# Patient Record
Sex: MALE | Race: WHITE | Employment: FULL TIME | URBAN - METROPOLITAN AREA
[De-identification: names, ages, dates, MRNs, and addresses within clinical notes are randomized per-mention and may not be internally consistent; named-entity substitution may affect disease eponyms.]

---

## 2022-08-15 ENCOUNTER — OFFICE VISIT (OUTPATIENT)
Dept: ORTHOPEDIC SURGERY | Age: 57
End: 2022-08-15
Payer: COMMERCIAL

## 2022-08-15 DIAGNOSIS — M25.572 CHRONIC PAIN OF BOTH ANKLES: Primary | ICD-10-CM

## 2022-08-15 DIAGNOSIS — M79.671 CHRONIC PAIN OF BOTH FEET: ICD-10-CM

## 2022-08-15 DIAGNOSIS — M79.672 CHRONIC PAIN OF BOTH FEET: ICD-10-CM

## 2022-08-15 DIAGNOSIS — G89.29 CHRONIC PAIN OF BOTH ANKLES: Primary | ICD-10-CM

## 2022-08-15 DIAGNOSIS — M25.571 CHRONIC PAIN OF BOTH ANKLES: Primary | ICD-10-CM

## 2022-08-15 DIAGNOSIS — G89.29 CHRONIC PAIN OF BOTH FEET: ICD-10-CM

## 2022-08-15 DIAGNOSIS — M76.61 ACHILLES TENDINITIS, RIGHT LEG: ICD-10-CM

## 2022-08-15 DIAGNOSIS — M76.62 ACHILLES TENDINITIS, LEFT LEG: ICD-10-CM

## 2022-08-15 PROCEDURE — 99214 OFFICE O/P EST MOD 30 MIN: CPT | Performed by: ORTHOPAEDIC SURGERY

## 2022-08-15 NOTE — PROGRESS NOTES
Name: Jordyn Silveira  YOB: 1965  Gender: male  MRN: [id number]    Summary:     Bilateral insertional Achilles tendinitis right greater than left     CC: New Patient (Bilateral ankle and foot pain. Xrays taken today in office)       HPI: Jordyn Silveira is a 61 y.o. male who presents with New Patient (Bilateral ankle and foot pain. Xrays taken today in office)  . This patient presents the office of longstanding history of 20+ years of increasing right posterior heel pain. I reviewed his outside medical records from [place]. He is tried physical therapy for this in the past.  Is also been told he had multiple extra bones in his foot and may require injections or surgery. His pain is getting worse and progressive tickly on the right. History was obtained by Patient     ROS/Meds/PSH/PMH/FH/SH: I personally reviewed the patients standard intake form. Below are the pertinents    Tobacco:  has no history on file for tobacco use. Diabetes: None      Physical Examination:    Exam of the bilateral feet shows swelling and posterior heel pain bilaterally with a large Anisha's deformity. He has palpable pulses and intact sensation. There is no plantar fascial insufficiency. There is no Achilles insufficiency. Imaging:   I independently interpreted XR taken today  Bilateral feet and ankles XR: AP, Lateral, Oblique views     ICD-10-CM    1. Chronic pain of both ankles  M25.571 XR ANKLE STANDARD BILATERAL    G89.29     M25.572       2. Chronic pain of both feet  M79.671 XR FOOT STANDARD BILATERAL    G89.29     M79.672       3. Achilles tendinitis, right leg  M76.61       4.  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

## 2022-08-17 DIAGNOSIS — M76.61 ACHILLES TENDINITIS, RIGHT LEG: Primary | ICD-10-CM

## 2022-08-24 ENCOUNTER — TELEPHONE (OUTPATIENT)
Dept: ORTHOPEDIC SURGERY | Age: 57
End: 2022-08-24

## 2022-08-24 NOTE — TELEPHONE ENCOUNTER
Pt called and wants to speak to MA about what the recovery process will be for his sx next month. Please call pt.

## 2022-08-25 ENCOUNTER — TELEPHONE (OUTPATIENT)
Dept: ORTHOPEDIC SURGERY | Age: 57
End: 2022-08-25

## 2022-08-25 NOTE — TELEPHONE ENCOUNTER
Spoke to patient and talked to him about being in a walker boot, using pain medication and no driving in the boot. Patient voiced understanding.

## 2022-09-28 RX ORDER — ASPIRIN 81 MG/1
81 TABLET ORAL DAILY
Status: ON HOLD | COMMUNITY
End: 2022-09-29 | Stop reason: SDUPTHER

## 2022-09-28 RX ORDER — MELOXICAM 15 MG/1
15 TABLET ORAL DAILY PRN
Status: ON HOLD | COMMUNITY
Start: 2021-09-21 | End: 2022-09-29 | Stop reason: HOSPADM

## 2022-09-28 NOTE — PERIOP NOTE
Patient verified name and . Order for consent  found in EHR and matches case posting; patient verifies procedure. Type 1B surgery,  assessment complete. Orders  received. Labs per surgeon: none ordered  Labs per anesthesia protocol: not indicated    Patient answered medical/surgical history questions at their best of ability. All prior to admission medications documented in [facility]. Patient instructed to take the following medications the day of surgery according to anesthesia guidelines with a small sip of water: none. On the day before surgery please take Acetaminophen 1000mg in the morning and then again before bed. You may substitute for Tylenol 650 mg. Hold all vitamins now for surgery and NSAIDS (aspirin 81 mg-preventative, ibuprofen) now for surgery. Prescription meds to hold: meloxicam now for surgery    Patient instructed on the following:    > Arrive at [address] [place] [address], time of arrival to be called the day before by 1700  > NPO after midnight, unless otherwise indicated, including gum, mints, and ice chips  > Responsible adult must drive patient to the hospital, stay during surgery, and patient will need supervision 24 hours after anesthesia  > Use antibacterial soap in shower the night before surgery and on the morning of surgery  > All piercings must be removed prior to arrival.    > Leave all valuables (money and jewelry) at home but bring insurance card and ID on DOS.   > You may be required to pay a deductible or co-pay on the day of your procedure. You can pre-pay by calling [phone] if your surgery is at the [facility] or [phone] if your surgery is at the [facility]. > Do not wear make-up, nail polish, lotions, cologne, perfumes, powders, or oil on skin.      Patient verbalized understanding and denied questions

## 2022-09-29 ENCOUNTER — ANESTHESIA EVENT (OUTPATIENT)
Dept: SURGERY | Age: 57
End: 2022-09-29
Payer: COMMERCIAL

## 2022-09-29 ENCOUNTER — TELEPHONE (OUTPATIENT)
Dept: ORTHOPEDIC SURGERY | Age: 57
End: 2022-09-29

## 2022-09-29 ENCOUNTER — ANESTHESIA (OUTPATIENT)
Dept: SURGERY | Age: 57
End: 2022-09-29
Payer: COMMERCIAL

## 2022-09-29 ENCOUNTER — HOSPITAL ENCOUNTER (OUTPATIENT)
Age: 57
Setting detail: OUTPATIENT SURGERY
Discharge: HOME | End: 2022-09-29
Attending: ORTHOPAEDIC SURGERY | Admitting: ORTHOPAEDIC SURGERY
Payer: COMMERCIAL

## 2022-09-29 VITALS
DIASTOLIC BLOOD PRESSURE: 80 MMHG | RESPIRATION RATE: 16 BRPM | OXYGEN SATURATION: 96 % | BODY MASS INDEX: 33.03 KG/M2 | HEART RATE: 82 BPM | HEIGHT: 69 IN | WEIGHT: 223 LBS | SYSTOLIC BLOOD PRESSURE: 120 MMHG | TEMPERATURE: 98 F

## 2022-09-29 DIAGNOSIS — G89.18 ACUTE POST-OPERATIVE PAIN: Primary | ICD-10-CM

## 2022-09-29 DIAGNOSIS — M76.61 TENDONITIS, ACHILLES, RIGHT: ICD-10-CM

## 2022-09-29 PROCEDURE — 6360000002 HC RX W HCPCS: Performed by: NURSE ANESTHETIST, CERTIFIED REGISTERED

## 2022-09-29 PROCEDURE — 28118 REMOVAL OF HEEL BONE: CPT | Performed by: ORTHOPAEDIC SURGERY

## 2022-09-29 PROCEDURE — 2580000003 HC RX 258: Performed by: NURSE PRACTITIONER

## 2022-09-29 PROCEDURE — 2709999900 HC NON-CHARGEABLE SUPPLY: Performed by: ORTHOPAEDIC SURGERY

## 2022-09-29 PROCEDURE — 7100000010 HC PHASE II RECOVERY - FIRST 15 MIN: Performed by: ORTHOPAEDIC SURGERY

## 2022-09-29 PROCEDURE — 64447 NJX AA&/STRD FEMORAL NRV IMG: CPT | Performed by: ANESTHESIOLOGY

## 2022-09-29 PROCEDURE — 64445 NJX AA&/STRD SCIATIC NRV IMG: CPT | Performed by: ANESTHESIOLOGY

## 2022-09-29 PROCEDURE — 2720000010 HC SURG SUPPLY STERILE: Performed by: ORTHOPAEDIC SURGERY

## 2022-09-29 PROCEDURE — 6360000002 HC RX W HCPCS: Performed by: NURSE PRACTITIONER

## 2022-09-29 PROCEDURE — 3600000014 HC SURGERY LEVEL 4 ADDTL 15MIN: Performed by: ORTHOPAEDIC SURGERY

## 2022-09-29 PROCEDURE — 2500000003 HC RX 250 WO HCPCS: Performed by: NURSE ANESTHETIST, CERTIFIED REGISTERED

## 2022-09-29 PROCEDURE — 27654 REPAIR OF ACHILLES TENDON: CPT | Performed by: ORTHOPAEDIC SURGERY

## 2022-09-29 PROCEDURE — 3600000004 HC SURGERY LEVEL 4 BASE: Performed by: ORTHOPAEDIC SURGERY

## 2022-09-29 PROCEDURE — 3700000001 HC ADD 15 MINUTES (ANESTHESIA): Performed by: ORTHOPAEDIC SURGERY

## 2022-09-29 PROCEDURE — 3700000000 HC ANESTHESIA ATTENDED CARE: Performed by: ORTHOPAEDIC SURGERY

## 2022-09-29 PROCEDURE — 27691 REVISE LOWER LEG TENDON: CPT | Performed by: ORTHOPAEDIC SURGERY

## 2022-09-29 PROCEDURE — 7100000000 HC PACU RECOVERY - FIRST 15 MIN: Performed by: ORTHOPAEDIC SURGERY

## 2022-09-29 PROCEDURE — 6360000002 HC RX W HCPCS: Performed by: ANESTHESIOLOGY

## 2022-09-29 PROCEDURE — C1713 ANCHOR/SCREW BN/BN,TIS/BN: HCPCS | Performed by: ORTHOPAEDIC SURGERY

## 2022-09-29 DEVICE — HEALICOIL KNOTLESS PK  NST
Type: IMPLANTABLE DEVICE | Site: ANKLE | Status: FUNCTIONAL
Brand: HEALICOIL

## 2022-09-29 DEVICE — SCREW INTFR L15MM DIA6.25MM BIOCOMPOSITE FACILITATE IORT: Type: IMPLANTABLE DEVICE | Site: ANKLE | Status: FUNCTIONAL

## 2022-09-29 DEVICE — TWINFIX ULTRA 4.5 MM POLY L LACTIC                                    ACID-HA SUTURE ANCHOR WITH TWO NO.2                                    ULTRABRAID SUTURES BLUE,                                    BLUE-COBRAID WITH NEEDLES
Type: IMPLANTABLE DEVICE | Site: ANKLE | Status: FUNCTIONAL
Brand: TWINFIX

## 2022-09-29 RX ORDER — FENTANYL CITRATE 50 UG/ML
100 INJECTION, SOLUTION INTRAMUSCULAR; INTRAVENOUS
Status: COMPLETED | OUTPATIENT
Start: 2022-09-29 | End: 2022-09-29

## 2022-09-29 RX ORDER — SODIUM CHLORIDE, SODIUM LACTATE, POTASSIUM CHLORIDE, CALCIUM CHLORIDE 600; 310; 30; 20 MG/100ML; MG/100ML; MG/100ML; MG/100ML
INJECTION, SOLUTION INTRAVENOUS CONTINUOUS
Status: DISCONTINUED | OUTPATIENT
Start: 2022-09-29 | End: 2022-09-29 | Stop reason: HOSPADM

## 2022-09-29 RX ORDER — PROCHLORPERAZINE EDISYLATE 5 MG/ML
5 INJECTION INTRAMUSCULAR; INTRAVENOUS
Status: DISCONTINUED | OUTPATIENT
Start: 2022-09-29 | End: 2022-09-29 | Stop reason: HOSPADM

## 2022-09-29 RX ORDER — ROCURONIUM BROMIDE 10 MG/ML
INJECTION, SOLUTION INTRAVENOUS PRN
Status: DISCONTINUED | OUTPATIENT
Start: 2022-09-29 | End: 2022-09-29 | Stop reason: SDUPTHER

## 2022-09-29 RX ORDER — HYDROMORPHONE HYDROCHLORIDE 2 MG/ML
0.5 INJECTION, SOLUTION INTRAMUSCULAR; INTRAVENOUS; SUBCUTANEOUS EVERY 5 MIN PRN
Status: DISCONTINUED | OUTPATIENT
Start: 2022-09-29 | End: 2022-09-29 | Stop reason: HOSPADM

## 2022-09-29 RX ORDER — SODIUM CHLORIDE 0.9 % (FLUSH) 0.9 %
5-40 SYRINGE (ML) INJECTION PRN
Status: DISCONTINUED | OUTPATIENT
Start: 2022-09-29 | End: 2022-09-29 | Stop reason: HOSPADM

## 2022-09-29 RX ORDER — SODIUM CHLORIDE 9 MG/ML
INJECTION, SOLUTION INTRAVENOUS PRN
Status: DISCONTINUED | OUTPATIENT
Start: 2022-09-29 | End: 2022-09-29 | Stop reason: HOSPADM

## 2022-09-29 RX ORDER — LIDOCAINE HYDROCHLORIDE 10 MG/ML
1 INJECTION, SOLUTION INFILTRATION; PERINEURAL
Status: DISCONTINUED | OUTPATIENT
Start: 2022-09-29 | End: 2022-09-29 | Stop reason: HOSPADM

## 2022-09-29 RX ORDER — DEXAMETHASONE SODIUM PHOSPHATE 4 MG/ML
INJECTION, SOLUTION INTRA-ARTICULAR; INTRALESIONAL; INTRAMUSCULAR; INTRAVENOUS; SOFT TISSUE PRN
Status: DISCONTINUED | OUTPATIENT
Start: 2022-09-29 | End: 2022-09-29 | Stop reason: SDUPTHER

## 2022-09-29 RX ORDER — ASPIRIN 81 MG/1
81 TABLET ORAL 2 TIMES DAILY
Qty: 42 TABLET | Refills: 0 | Status: SHIPPED | OUTPATIENT
Start: 2022-09-29

## 2022-09-29 RX ORDER — PROPOFOL 10 MG/ML
INJECTION, EMULSION INTRAVENOUS PRN
Status: DISCONTINUED | OUTPATIENT
Start: 2022-09-29 | End: 2022-09-29 | Stop reason: SDUPTHER

## 2022-09-29 RX ORDER — SODIUM CHLORIDE, SODIUM LACTATE, POTASSIUM CHLORIDE, CALCIUM CHLORIDE 600; 310; 30; 20 MG/100ML; MG/100ML; MG/100ML; MG/100ML
100 INJECTION, SOLUTION INTRAVENOUS CONTINUOUS
Status: DISCONTINUED | OUTPATIENT
Start: 2022-09-29 | End: 2022-09-29 | Stop reason: SDUPTHER

## 2022-09-29 RX ORDER — LIDOCAINE HYDROCHLORIDE 20 MG/ML
INJECTION, SOLUTION EPIDURAL; INFILTRATION; INTRACAUDAL; PERINEURAL PRN
Status: DISCONTINUED | OUTPATIENT
Start: 2022-09-29 | End: 2022-09-29 | Stop reason: SDUPTHER

## 2022-09-29 RX ORDER — CEPHALEXIN 500 MG/1
500 CAPSULE ORAL 4 TIMES DAILY
Qty: 12 CAPSULE | Refills: 0 | Status: SHIPPED | OUTPATIENT
Start: 2022-09-29

## 2022-09-29 RX ORDER — OXYCODONE HYDROCHLORIDE 5 MG/1
5 TABLET ORAL EVERY 6 HOURS PRN
Qty: 20 TABLET | Refills: 0 | Status: SHIPPED | OUTPATIENT
Start: 2022-09-29 | End: 2022-10-04

## 2022-09-29 RX ORDER — MIDAZOLAM HYDROCHLORIDE 2 MG/2ML
2 INJECTION, SOLUTION INTRAMUSCULAR; INTRAVENOUS
Status: COMPLETED | OUTPATIENT
Start: 2022-09-29 | End: 2022-09-29

## 2022-09-29 RX ORDER — OXYCODONE HYDROCHLORIDE 5 MG/1
5 TABLET ORAL
Status: DISCONTINUED | OUTPATIENT
Start: 2022-09-29 | End: 2022-09-29 | Stop reason: HOSPADM

## 2022-09-29 RX ORDER — ONDANSETRON 2 MG/ML
INJECTION INTRAMUSCULAR; INTRAVENOUS PRN
Status: DISCONTINUED | OUTPATIENT
Start: 2022-09-29 | End: 2022-09-29 | Stop reason: SDUPTHER

## 2022-09-29 RX ORDER — SODIUM CHLORIDE 0.9 % (FLUSH) 0.9 %
5-40 SYRINGE (ML) INJECTION EVERY 12 HOURS SCHEDULED
Status: DISCONTINUED | OUTPATIENT
Start: 2022-09-29 | End: 2022-09-29 | Stop reason: HOSPADM

## 2022-09-29 RX ADMIN — MIDAZOLAM HYDROCHLORIDE 2 MG: 1 INJECTION, SOLUTION INTRAMUSCULAR; INTRAVENOUS at 07:59

## 2022-09-29 RX ADMIN — ROCURONIUM BROMIDE 50 MG: 50 INJECTION, SOLUTION INTRAVENOUS at 08:53

## 2022-09-29 RX ADMIN — SUGAMMADEX 200 MG: 100 INJECTION, SOLUTION INTRAVENOUS at 09:55

## 2022-09-29 RX ADMIN — FENTANYL CITRATE 100 MCG: 50 INJECTION, SOLUTION INTRAMUSCULAR; INTRAVENOUS at 07:59

## 2022-09-29 RX ADMIN — PROPOFOL 200 MG: 10 INJECTION, EMULSION INTRAVENOUS at 08:53

## 2022-09-29 RX ADMIN — ONDANSETRON 4 MG: 2 INJECTION INTRAMUSCULAR; INTRAVENOUS at 09:09

## 2022-09-29 RX ADMIN — SODIUM CHLORIDE, POTASSIUM CHLORIDE, SODIUM LACTATE AND CALCIUM CHLORIDE: 600; 310; 30; 20 INJECTION, SOLUTION INTRAVENOUS at 07:14

## 2022-09-29 RX ADMIN — DEXAMETHASONE SODIUM PHOSPHATE 4 MG: 4 INJECTION, SOLUTION INTRAMUSCULAR; INTRAVENOUS at 09:09

## 2022-09-29 RX ADMIN — Medication 2000 MG: at 09:06

## 2022-09-29 RX ADMIN — PROPOFOL 50 MG: 10 INJECTION, EMULSION INTRAVENOUS at 09:52

## 2022-09-29 RX ADMIN — LIDOCAINE HYDROCHLORIDE 60 MG: 20 INJECTION, SOLUTION EPIDURAL; INFILTRATION; INTRACAUDAL; PERINEURAL at 08:53

## 2022-09-29 ASSESSMENT — PAIN - FUNCTIONAL ASSESSMENT: PAIN_FUNCTIONAL_ASSESSMENT: 0-10

## 2022-09-29 NOTE — DISCHARGE INSTRUCTIONS
INSTRUCTIONS FOLLOWING FOOT SURGERY    ACTIVITY  Elevate foot. No Ice    1.)No weight bearing. Use crutches or knee walker until seen in follow up appointment    Blood clot prevention:  As instructed by Dr Westley Lugo: Take 81 mg twice daily if okay with your medical doctor and you have no GI ulcer. Get up and out of bed frequently. While in bed move the legs as much as possible. DRESSING CARE Keep dry and in place until follow up appointment. Cover with cast bag or plastic bag when showering. Let the office know if dressing gets saturated with water. Don't put anything into the splint to relieve itching etc.     CALL YOUR DOCTOR IF YOU HAVE  Excessive bleeding that does not stop after holding mild pressure over the area. Temperature of 101 degrees or above. Redness, excessive swelling or bruising, and/or green or yellow, smelly discharge from incision. Loss of sensation - cold, white or blue toes. DIET  Day of Surgery: Clear liquids until no nausea or vomiting; then light, bland diet (Baked chicken, plain rice, grits, scrambled egg, toast). Nothing Greasy, fried or spicy today  Advance to regular diet on second day, unless your doctor orders otherwise. PAIN  Take pain medications as directed by your doctor. Call your doctor if pain is NOT relieved by medication. MEDICATION INTERACTION:  During your procedure you potentially received a medication or medications which may reduce the effectiveness of oral contraceptives. Please consider other forms of contraception for 1 month following your procedure if you are currently using oral contraceptives as your primary form of birth control.  In addition to this, we recommend continuing your oral contraceptive as prescribed, unless otherwise instructed by your physician, during this time    After general anesthesia or intravenous sedation, for 24 hours or while taking prescription Narcotics:  Limit your activities  A responsible adult needs to be with you for the next 24 hours  Do not drive and operate hazardous machinery  Do not make important personal or business decisions  Do not drink alcoholic beverages  If you have not urinated within 8 hours after discharge, and you are experiencing discomfort from urinary retention, please go to the nearest ED. If you have sleep apnea and have a CPAP machine, please use it for all naps and sleeping. Please use caution when taking narcotics and any of your home medications that may cause drowsiness. *  Please give a list of your current medications to your Primary Care Provider. *  Please update this list whenever your medications are discontinued, doses are      changed, or new medications (including over-the-counter products) are added. *  Please carry medication information at all times in case of emergency situations. These are general instructions for a healthy lifestyle:  No smoking/ No tobacco products/ Avoid exposure to second hand smoke  Surgeon General's Warning:  Quitting smoking now greatly reduces serious risk to your health. Obesity, smoking, and sedentary lifestyle greatly increases your risk for illness  A healthy diet, regular physical exercise & weight monitoring are important for maintaining a healthy lifestyle    You may be retaining fluid if you have a history of heart failure or if you experience any of the following symptoms:  Weight gain of 3 pounds or more overnight or 5 pounds in a week, increased swelling in our hands or feet or shortness of breath while lying flat in bed. Please call your doctor as soon as you notice any of these symptoms; do not wait until your next office visit. Learning About How to Use Crutches  Your Care Instructions  Crutches can help you walk when you have an injured hip, leg, knee, ankle, or foot. Your doctor will tell you how much weight--if any--you can put on your leg. Be sure your crutches fit you.  When you stand up in your normal posture, there should be space for two or three fingers between the top of the crutch and your armpit. When you let your hands hang down, the hand  should be at your wrists. When you put your hands on the hand , your elbows should be slightly bent. To stay safe when using crutches:  Look straight ahead, not down at your feet. Clear away small rugs, cords, or anything else that could cause you to trip, slip, or fall. Be very careful around pets and small children. They can get in your path when you least expect it. Be sure the rubber tips on your crutches are clean and in good condition to help prevent slipping. Avoid slick conditions, such as wet floors and snowy or icy driveways. In bad weather, be especially careful on curbs and steps. How to use crutches  Getting ready to walk    [facility] your elbows slightly. Press the padded top parts of the crutches against your sides, under your armpits. If you have been told not to put any weight on your injured leg, keep that leg bent and off the ground. Walking with crutches    Put both crutches about 12 inches in front of you. Put your weight on the handgrips, not on the pads under your arms. (Constant pressure against your underarms can cause numbness.) Swing your body forward. (If you have been told not to put any weight on your injured leg, keep that leg bent and off the ground.)  To complete the step, put your weight on the strong leg. Move your crutches about 12 inches in front of you, and start the next step. When you're confident using the crutches, you can move the crutches and your injured leg at the same time. Then push straight down on the crutches as you step past the crutches with your strong leg, as you would in normal walking. Take small steps. Use ramps and elevators when you can. Sitting down    To sit, back up to the chair. Use one hand to hold both crutches by the handgrips, beside your injured leg.  With the other hand, hold onto the seat and slowly lower yourself onto the chair. Lay the crutches on the ground near your chair. If you prop them up, they may fall over. Getting up from a chair    To get up from a chair,  the crutches and put them in one hand beside your injured leg. Put your weight on the handgrips of the crutches and on your strong leg to stand up. Walking up stairs    To go up stairs, step up with your strong leg and then bring the crutches and your injured leg to the upper step. For stairs that have handrails: Put both crutches under the arm opposite the handrail. Use the hand opposite the handrail to hold both crutches by the handgrips. Hold onto the handrail as you go up. Put your strong leg on the step first when you go up. Walking down stairs    To go down stairs, put your crutches and injured leg on the lower step. Bring your strong leg to the lower step. This saying may help you remember: \"Up with the good, down with the bad. \"  For stairs that have handrails: Put both crutches under the arm opposite the handrail. Use the hand opposite the handrail to hold both crutches by the handgrips. Hold onto the handrail as you go down. Follow the same process you use for stairs: Lead with your crutches and injured leg on the way down.

## 2022-09-29 NOTE — BRIEF OP NOTE
Brief Postoperative Note      Patient: Crow Farfan  YOB: 1965  MRN: [id number]    Date of Procedure: 9/29/2022    Pre-Op Diagnosis: Tendonitis, Achilles, right [M76.61]    Post-Op Diagnosis: Same       Procedure(s):  right achilles secondary reconstruction/repair/Haglunds excision/possible flexor hallucis longus transfer    Surgeon(s):  Alyssa Ruth MD    Assistant:  * No surgical staff found *    Anesthesia: General    Estimated Blood Loss (mL): Minimal    Complications: None    Specimens:   * No specimens in log *    Implants:  Implant Name Type Inv. Item Serial No.  Lot No. LRB No. Used Action   ANCHOR SUT 2 DIA2.5MM [employer] NONABSORBABLE ULTBRAID DB - [id number]  ANCHOR SUT 2 DIA2.5MM COLTON PLLA NONABSORBABLE ULTBRAID DBL  [employer]WD [id number] Right 2 Implanted   ANCHOR SUTURE 5.0 MM KNOTLESS HEALICOIL - [id number]  ANCHOR SUTURE 5.0 MM KNOTLESS HEALICOIL  [employer]WD [id number] Right 2 Implanted   SCREW INTFR L15MM DIA6. 25MM BIOCOMPOSITE FACILITATE IORT - [id number]  SCREW INTFR L15MM DIA6. 25MM BIOCOMPOSITE FACILITATE IORT  [employer]WD [id number] Right 1 Implanted         Drains: * No LDAs found *    Findings:     Electronically signed by Kiran Iverson MD on 9/29/2022 at 9:54 AM

## 2022-09-29 NOTE — ANESTHESIA POSTPROCEDURE EVALUATION
Department of Anesthesiology  Postprocedure Note    Patient: Arlene Villa  MRN: [id number]  YOB: 1965  Date of evaluation: 9/29/2022      Procedure Summary     Date: 09/29/22 Room / Location: [facility] OP OR 01 / [facility]    Anesthesia Start: 4692 Anesthesia Stop: 1007    Procedure: right achilles secondary reconstruction/repair/Haglunds excision/possible flexor hallucis longus transfer (Right: Ankle) Diagnosis:       Tendonitis, Achilles, right      (Tendonitis, Achilles, right [M76.61])    Surgeons: Adriano Ryan MD Responsible Provider: Henna Lopez MD    Anesthesia Type: general ASA Status: 2          Anesthesia Type: No value filed. Soheila Phase I: Soheila Score: 8    Soheila Phase II: Soheila Score: 10      Anesthesia Post Evaluation    Patient location during evaluation: PACU  Patient participation: complete - patient participated  Level of consciousness: awake and alert  Airway patency: patent  Nausea & Vomiting: no nausea and no vomiting  Complications: no  Cardiovascular status: hemodynamically stable  Respiratory status: acceptable, nonlabored ventilation and spontaneous ventilation  Hydration status: euvolemic  Comments: /80   Pulse 82   Temp 98 °F (36.7 °C)   Resp 16   Ht 5' 9\" (1.753 m)   Wt 223 lb (101.2 kg)   SpO2 96%   BMI 32.93 kg/m²   Based on chart review, not called for sign out.     Multimodal analgesia pain management approach

## 2022-09-29 NOTE — OP NOTE
Operative Note    Patient:Kostas Louis  MRN: [id number]    Date Of Surgery: 9/29/2022    Surgeon: Samantha Davis MD    Assistant Surgeon: None    Pre Op Diagnosis:  Tendonitis, Achilles, right [M76.61]    Post Op Diagnosis:   same    Procedures Performed:  Right secondary repair of Achilles tendon with debridement and reconstruction, 08351  Right flexor hallucis longus tendon transfer, 01346  Right Anisha's deformity excision of calcaneus, 03043    Implants:   Implant Name Type Inv. Item Serial No.  Lot No. LRB No. Used Action   ANCHOR SUT 2 DIA2.5MM COLTON PLLA NONABSORBABLE ULTBRAID DBL - [id number]  ANCHOR SUT 2 DIA2.5MM COLTON PLLA NONABSORBABLE ULTBRAID DBL  [employer]-WD [id number] Right 2 Implanted   ANCHOR SUTURE 5.0 MM KNOTLESS HEALICOIL - [id number]  ANCHOR SUTURE 5.0 MM KNOTLESS HEALICOIL  [employer]WD [id number] Right 2 Implanted   SCREW INTFR L15MM DIA6. 25MM BIOCOMPOSITE FACILITATE IORT - [id number]  SCREW INTFR L15MM DIA6. 25MM BIOCOMPOSITE FACILITATE IORT  [employer]WD [id number] Right 1 Implanted       Anesthesia:  General    Blood Loss:  minimal    Tourniquet:  Estimated thigh 34 minutes    Pre Operative Abx:   Ancef 2g            Pre Operative Course:  Ingrid Hylton is a 61 y.o. male who has a history of right insertional Achilles tendinitis. Operation In Detail:  Patient was evaluated in the preoperative area. We had a long discussion about the procedure and postoperative protocols. The patient was then brought back to the operating room suite and placed in the operating room table. A timeout was taken to identify the patient, procedure being performed, and laterality. After this the patient was prepped and draped in the normal sterile fashion using a Betadine solution and/or a ChloraPrep solution. A timeout was then taken to identify the patient his name, date of birth, laterality, and procedure being performed.   We also identified allergies and any concerns about the operation. Attention was then placed to the operative extremity. A block was placed by the department of anesthesia. In a preop surgical timeout the right lower extremity was identified as a correct surgical site and prepped and draped in the standard sterile fashion using ChloraPrep solution. A direct posterior approach the heel was and opened followed by central splitting approach to the distal Achilles. Approximate 50% of the distal Achilles was debrided due to severe tendinosis. The Anisha's deformity was excised using an osteotome and a power rasp. The deep fascial layer was then incised and the FHL tendon was identified the fibro-osseous tunnel and harvested while taking care to pad the neurovascular bundle. The FHL transfer was brought down a drill hole in the calcaneus and secured using interference screw. A suture bridge system was used to reattach the Achilles back to the calcaneus. The tendon was repaired using Vicryl suture. The skin was repaired using Monocryl nylon sutures. Sterile dressings were applied followed by well-padded posterior splint. Anesthesia was discontinued. The patient was transferred back to recovery bed. He was taken to recovery in satisfactory condition. He appeared to tolerate the procedure well. There were no apparent surgical or anesthetic complications. All needle and sponge counts were correct. A sterile dressing was then applied to the leg and Posterior slab splint. They were awoken from anesthesia and returned to the PACU without difficulty.     Post Operative Plan:   1- WB status: Non-Weight Bearing   2- Immobilization/assistive devices: walker  3- DVT px: ASA 81 mg BID

## 2022-09-29 NOTE — H&P
Outpatient Surgery History and Physical:  Eve Polk was seen and examined. CHIEF COMPLAINT:   right foot. PE:   Ht 5' 9\" (1.753 m)   Wt 223 lb (101.2 kg)   BMI 32.93 kg/m²     Heart:   Regular rhythm      Lungs:  Are clear      Past Medical History:    Past Medical History:   Diagnosis Date    COVID-19 06/2022    patient reported- \"runny nose\"       Surgical History:   Past Surgical History:   Procedure Laterality Date    CHOLECYSTECTOMY      COLONOSCOPY      ORTHOPEDIC SURGERY Right     knee surgery       Social History: Patient  reports that he has never smoked. He has never used smokeless tobacco. He reports that he does not currently use alcohol. He reports that he does not currently use drugs. Family History: History reviewed. No pertinent family history. Allergies: Reviewed per EMR  Patient has no known allergies. Medications:    Prior to Admission medications    Medication Sig Start Date End Date Taking? Authorizing Provider   meloxicam (MOBIC) 15 MG tablet Take 15 mg by mouth daily as needed 9/21/21  Yes Historical Provider, MD   Multiple Vitamin (MULTIVITAMIN ADULT PO) Take by mouth daily   Yes Historical Provider, MD   aspirin 81 MG EC tablet Take 81 mg by mouth daily Preventative    Historical Provider, MD       The surgery is planned for the right  Achilles secondary reconstruction/repair with possible FHL transfer and Haglunds excision. History and physical has been reviewed. The patient has been examined. There have been no significant clinical changes since the completion of the originally dated History and Physical.  Patient identified by surgeon; surgical site was confirmed by patient and surgeon. The patient is here today for outpatient surgery. I have examined the patient, no changes are noted in the patient's medical status. Necessity for the procedure/care is still present and the history and physical above is current.   See the office notes for the full long term history of the problem. Please see the recent office notes for the musculoskeletal examination.     Signed By: RENAE Head - ELLI     September 29, 2022 6:46 AM

## 2022-09-29 NOTE — TELEPHONE ENCOUNTER
Want to know if it is normal for his toes to still be numb? His block was this morning.  Please call

## 2022-09-29 NOTE — ANESTHESIA PROCEDURE NOTES
Peripheral Block    Patient location during procedure: pre-op  Reason for block: post-op pain management and at surgeon's request  Start time: 9/29/2022 8:07 AM  End time: 9/29/2022 8:09 AM  Staffing  Performed: anesthesiologist   Anesthesiologist: Zuly Ramirez MD  Preanesthetic Checklist  Completed: patient identified, IV checked, site marked, risks and benefits discussed, surgical/procedural consents, equipment checked, pre-op evaluation, timeout performed, anesthesia consent given, oxygen available and monitors applied/VS acknowledged  Peripheral Block   Patient position: supine  Prep: ChloraPrep  Provider prep: mask and sterile gloves  Patient monitoring: cardiac monitor, continuous pulse ox, frequent blood pressure checks, IV access, oxygen and responsive to questions  Block type: Femoral  Adductor canal  Laterality: right  Injection technique: single-shot  Guidance: ultrasound guided    Needle   Needle type: insulated echogenic nerve stimulator needle   Needle gauge: 20 G  Needle localization: ultrasound guidance  Needle length: 10 cm  Assessment   Injection assessment: negative aspiration for heme, no paresthesia on injection, no intravascular symptoms and local visualized surrounding nerve on ultrasound  Slow fractionated injection: yes  Hemodynamics: stable  Real-time US image taken/store: yes  Outcomes: patient tolerated procedure well and uncomplicated    Additional Notes  3 cc 1% lidocaine local at needle insertion site. Risks discussed including damage to muscle or nerve. Needle inserted and placed in close proximity to the nerve under real time ultrasound guidance. Ultrasound was used to visualize the spread of local anesthetic in close proximity to the nerve being blocked. All structures appeared anatomically normal and there were no abnormal findings. Ultrasound imaged printed and placed on chart.     Medications Administered  EPINEPHrine PF 1 MG/ML Soln, ropivacaine 0.5% Soln, sodium

## 2022-09-29 NOTE — PERIOP NOTE
PACU DISCHARGE NOTE    Patient's wife voiced understanding of discharge instructions. No questions at this time. Vital signs stable, pain well controlled, alert and oriented times three or at baseline, follow up per surgeon, no anesthetic complications.

## 2022-09-29 NOTE — ANESTHESIA PROCEDURE NOTES
Airway  Date/Time: 9/29/2022 8:55 AM  Urgency: elective    Airway not difficult    General Information and Staff    Patient location during procedure: OR    Indications and Patient Condition  Indications for airway management: anesthesia and airway protection  Spontaneous Ventilation: absent  Sedation level: deep  Preoxygenated: yes  MILS maintained throughout  Mask difficulty assessment: vent by bag mask + OA or adjuvant +/- NMBA    Final Airway Details  Final airway type: endotracheal airway      Successful airway: ETT  Cuffed: yes   Successful intubation technique: direct laryngoscopy  Facilitating devices/methods: intubating stylet  Endotracheal tube insertion site: oral  Blade: Kushal  Blade size: #4  ETT size (mm): 8.0  Cormack-Lehane Classification: grade I - full view of glottis  Placement verified by: chest auscultation and capnometry   Measured from: lips  ETT to lips (cm): 23  Number of attempts at approach: 1  Ventilation between attempts: bag mask  Number of other approaches attempted: 0    Additional Comments  Atraumatic intubation. Grade I view. Ett secured. BBS ETCO2.  Vss.  no

## 2022-09-29 NOTE — ANESTHESIA PROCEDURE NOTES
Peripheral Block    Patient location during procedure: pre-op  Reason for block: post-op pain management and at surgeon's request  Start time: 9/29/2022 7:59 AM  End time: 9/29/2022 8:06 AM  Staffing  Performed: anesthesiologist   Anesthesiologist: Henna Lopez MD  Preanesthetic Checklist  Completed: patient identified, site marked, risks and benefits discussed, equipment checked, pre-op evaluation, timeout performed, anesthesia consent given, oxygen available and monitors applied/VS acknowledged  Peripheral Block   Prep: ChloraPrep  Provider prep: mask and sterile gloves  Patient monitoring: cardiac monitor, continuous pulse ox, oxygen, IV access, frequent blood pressure checks and responsive to questions  Block type: Sciatic  Popliteal  Laterality: right  Injection technique: single-shot  Guidance: nerve stimulator and ultrasound guided    Needle   Needle type: insulated echogenic nerve stimulator needle   Needle gauge: 20 G  Needle localization: nerve stimulator and ultrasound guidance (minimal motor response at >0.4 mA)  Needle length: 10 cm  Assessment   Injection assessment: negative aspiration for heme, no paresthesia on injection, local visualized surrounding nerve on ultrasound and no intravascular symptoms  Slow fractionated injection: yes  Hemodynamics: stable  Real-time US image taken/store: yes  Outcomes: patient tolerated procedure well and uncomplicated    Additional Notes  Risks/benefits/alternatives discussed including damage to nerve or muscle. 3 cc 1% lidocaine local injected at needle insertion site. Needle inserted and placed in close proximity to the nerve under real time ultrasound guidance. Ultrasound was used to visualize the spread of local anesthetic in close proximity to the nerve being blocked. The nerve appeared anatomically normal and there were no abnormal findings. Ultrasound imaged printed and placed on chart.       Medications Administered  EPINEPHrine PF 1 MG/ML Soln, ropivacaine 0.5% Soln, sodium chloride (PF) 0.9 % Soln (Mixture components: EPINEPHrine PF 1 MG/ML Soln, 0.005 mL; ropivacaine 0.5% Soln, 0.75 mL; sodium chloride (PF) 0.9 % Soln, 0.245 mL) - Perineural   35 mL - 9/29/2022 7:59:00 AM

## 2022-09-29 NOTE — ANESTHESIA PRE PROCEDURE
Department of Anesthesiology  Preprocedure Note       Name:  Theron Paul   Age:  61 y.o.  :  1965                                          MRN:  [id number]         Date:  2022      Surgeon: Sánchez Garcia):  Deepa Morris MD    Procedure: Procedure(s):  right achilles secondary reconstruction/repair/Haglunds excision/possible flexor hallucis longus transfer    Medications prior to admission:   Prior to Admission medications    Medication Sig Start Date End Date Taking?  Authorizing Provider   meloxicam (MOBIC) 15 MG tablet Take 15 mg by mouth daily as needed 21  Yes Historical Provider, MD   Multiple Vitamin (MULTIVITAMIN ADULT PO) Take by mouth daily   Yes Historical Provider, MD   aspirin 81 MG EC tablet Take 81 mg by mouth daily Preventative    Historical Provider, MD       Current medications:    Current Facility-Administered Medications   Medication Dose Route Frequency Provider Last Rate Last Admin    ceFAZolin (ANCEF) 2000 mg in sterile water 20 mL IV syringe  2,000 mg IntraVENous On Call to Rehan 36, APRN - CNP        lactated ringers infusion   IntraVENous Continuous Elder Holt, APRN -  mL/hr at 22 0714 New Bag at 22 0714    sodium chloride flush 0.9 % injection 5-40 mL  5-40 mL IntraVENous 2 times per day Elder Holt, APRN - CNP        sodium chloride flush 0.9 % injection 5-40 mL  5-40 mL IntraVENous PRN Elder Holt, APRN - CNP        0.9 % sodium chloride infusion   IntraVENous PRN Elder Holt, APRN - CNP        lidocaine 1 % injection 1 mL  1 mL IntraDERmal Once PRN Gayle Ahuja MD        fentaNYL (SUBLIMAZE) injection 100 mcg  100 mcg IntraVENous Once PRN Gayle Ahuja MD        midazolam PF (VERSED) injection 2 mg  2 mg IntraVENous Once PRN Gayle Ahuja MD           Allergies:  No Known Allergies    Problem List:    Patient Active Problem List   Diagnosis Code    Tendonitis, Achilles, right M76.61       Past Medical History:        Diagnosis Date    COVID-19 06/2022    patient reported- \"runny nose\"       Past Surgical History:        Procedure Laterality Date    CHOLECYSTECTOMY      COLONOSCOPY      ORTHOPEDIC SURGERY Right     knee surgery       Social History:    Social History     Tobacco Use    Smoking status: Never    Smokeless tobacco: Never   Substance Use Topics    Alcohol use: Not Currently                                Counseling given: Not Answered      Vital Signs (Current):   Vitals:    09/28/22 0914 09/29/22 0728   BP:  (!) 142/88   Pulse:  81   Resp:  17   Temp:  98.1 °F (36.7 °C)   TempSrc:  Oral   SpO2:  98%   Weight: 223 lb (101.2 kg)    Height: 5' 9\" (1.753 m)                                               BP Readings from Last 3 Encounters:   09/29/22 (!) 142/88       NPO Status: Time of last liquid consumption: 2200                        Time of last solid consumption: 2200                        Date of last liquid consumption: 09/28/22                        Date of last solid food consumption: 09/28/22    BMI:   Wt Readings from Last 3 Encounters:   09/28/22 223 lb (101.2 kg)     Body mass index is 32.93 kg/m². CBC: No results found for: WBC, RBC, HGB, HCT, MCV, RDW, PLT    CMP: No results found for: NA, K, CL, CO2, BUN, CREATININE, GFRAA, AGRATIO, LABGLOM, GLUCOSE, GLU, PROT, CALCIUM, BILITOT, ALKPHOS, AST, ALT    POC Tests: No results for input(s): POCGLU, POCNA, POCK, POCCL, POCBUN, POCHEMO, POCHCT in the last 72 hours.     Coags: No results found for: PROTIME, INR, APTT    HCG (If Applicable): No results found for: PREGTESTUR, PREGSERUM, HCG, HCGQUANT     ABGs: No results found for: PHART, PO2ART, YAO5YFW, ZHP5RKE, BEART, V3JDLGAF     Type & Screen (If Applicable):  No results found for: LABABO, LABRH    Drug/Infectious Status (If Applicable):  No results found for: HIV, HEPCAB    COVID-19 Screening (If Applicable): No results found for: COVID19        Anesthesia Evaluation    Airway: Mallampati: II  TM distance: >3 FB   Neck ROM: full  Mouth opening: > = 3 FB   Dental: normal exam         Pulmonary:Negative Pulmonary ROS and normal exam  breath sounds clear to auscultation                             Cardiovascular:Negative CV ROS  Exercise tolerance: good (>4 METS),           Rhythm: regular  Rate: normal                    Neuro/Psych:   Negative Neuro/Psych ROS              GI/Hepatic/Renal: Neg GI/Hepatic/Renal ROS            Endo/Other: Negative Endo/Other ROS                    Abdominal:             Vascular: negative vascular ROS. Other Findings:           Anesthesia Plan      general     ASA 2       Induction: intravenous. Anesthetic plan and risks discussed with patient and spouse.               Post-op pain plan if not by surgeon: single peripheral nerve block            Silvana Garcia MD   9/29/2022

## 2022-10-03 ENCOUNTER — TELEPHONE (OUTPATIENT)
Dept: ORTHOPEDIC SURGERY | Age: 57
End: 2022-10-03

## 2022-10-03 ENCOUNTER — HOSPITAL ENCOUNTER (OUTPATIENT)
Dept: ULTRASOUND IMAGING | Age: 57
Discharge: HOME | End: 2022-10-05
Payer: COMMERCIAL

## 2022-10-03 ENCOUNTER — OFFICE VISIT (OUTPATIENT)
Dept: ORTHOPEDIC SURGERY | Age: 57
End: 2022-10-03
Payer: COMMERCIAL

## 2022-10-03 DIAGNOSIS — M76.61 ACHILLES TENDINITIS, RIGHT LEG: ICD-10-CM

## 2022-10-03 DIAGNOSIS — M79.661 RIGHT CALF PAIN: Primary | ICD-10-CM

## 2022-10-03 DIAGNOSIS — M79.661 RIGHT CALF PAIN: ICD-10-CM

## 2022-10-03 PROCEDURE — L4360 PNEUMAT WALKING BOOT PRE CST: HCPCS | Performed by: NURSE PRACTITIONER

## 2022-10-03 PROCEDURE — 93971 EXTREMITY STUDY: CPT

## 2022-10-03 PROCEDURE — 99024 POSTOP FOLLOW-UP VISIT: CPT | Performed by: NURSE PRACTITIONER

## 2022-10-03 NOTE — TELEPHONE ENCOUNTER
He states his calf has been hurting and he has been taking his asa. He also says his wrap was really tight and he had to loosen it. Please call to discuss.

## 2022-10-03 NOTE — PROGRESS NOTES
The patient was prescribed a walker boot for the patient's right foot. The patient wears a size NA shoe and I fitted them with a L size boot. The patient was fitted and instructed on the use of prescribed walker boot. I explained how to fit themselves and that the plastic flexible piece should always be on the front of the boot and secured by the Velcro straps on top. The air bladder in the boot was adjusted according to proper fit and comfort. The patient walked a short distance and acknowledged satisfaction with current fit. I also explained that they need a heel lift or a higher heeled shoe for the uninvolved LE to help normalize gait and avoid excessive low back stress/strain due to leg length inequality created from walker boot. Patient read and signed documenting they understand and agree to [facility]'s current DME return policy.

## 2022-10-03 NOTE — PROGRESS NOTES
Patient here initially early for his original postop visit due to increased pain from the splint. The splint was removed where the wound was evaluated today. There was a stitch that had popped along the incisional line, the suture was removed and the wound was redressed applying an Ace wrap over the sterile portion of the dressing. The wound was draining blood at today's visit. Patient was placed into a cam walker boot where he is not allowed to bear weight on the affected extremity at this time. He will follow-up on Friday for an additional wound check and redressing of the wound. He had palpable pulses and intact sensation to the foot today he is able to wiggle his toes. There was pain with palpation to the posterior ankle. All sutures were intact along the incisional line except the one I removed.

## 2022-10-05 DIAGNOSIS — M76.61 ACHILLES TENDINITIS, RIGHT LEG: Primary | ICD-10-CM

## 2022-10-05 RX ORDER — OXYCODONE HYDROCHLORIDE 5 MG/1
5 TABLET ORAL EVERY 6 HOURS PRN
Qty: 20 TABLET | Refills: 0 | Status: SHIPPED | OUTPATIENT
Start: 2022-10-05 | End: 2022-10-10

## 2022-10-05 NOTE — TELEPHONE ENCOUNTER
He is still having a good bit of pain in his calf area and is wondering if this is common. He will also run out today of the oxycodone. He is also wondering if it's okay for him to remove the boot if he's just sitting down. It is very painful when it is pressing on his calf. Please call to discuss and also send the refill to the pharmacy on file.

## 2022-10-05 NOTE — TELEPHONE ENCOUNTER
Spoke to patient who stated the pain may be coming from the plastic piece in the boot. I informed patient he can remove the boot only while sitting down. I also instructed patient to continue to take the ASA and he said he will. Patient had a doppler 2 days ago which was negative.

## 2022-10-07 ENCOUNTER — OFFICE VISIT (OUTPATIENT)
Dept: ORTHOPEDIC SURGERY | Age: 57
End: 2022-10-07

## 2022-10-07 DIAGNOSIS — M76.61 ACHILLES TENDINITIS, RIGHT LEG: Primary | ICD-10-CM

## 2022-10-07 PROCEDURE — 99024 POSTOP FOLLOW-UP VISIT: CPT | Performed by: NURSE PRACTITIONER

## 2022-10-07 NOTE — PROGRESS NOTES
Name: Carmen Howell  YOB: 1965  Gender: male  MRN: [id number]    Procedure Performed:  Right secondary repair of Achilles tendon with debridement and reconstruction  Right flexor hallucis longus tendon transfer  Right Anisha's deformity excision of calcaneus        Date of Procedure: 09/29/2022      Subjective: Patient reports following postoperative instructions since his last visit earlier this week. He is here today for wound check. He notes that he continues to have calf pain however it has lessened since his visit on Monday and does seem to be getting better. He noted that it feels just like an intense stretch when he flexes his foot. Physical Examination: Incision still looks good and is continue to heal well with no signs of infection. He has palpable pulses and intact sensation to the foot. Sutures are still intact. He also has no signs of DVT at this time, denies having any calf pain or behind the knee pain and presents with a negative Seleta Blake' sign. There were no other physical exams performed today. Imaging:   No imaging reviewed          Assessment:   That is post right secondary Achilles reconstruction with FHL tendon transfer and Anisha's excision. We discussed progression of care until his original follow-up visit for suture removal, as well as how often and when he should be wearing the boot, and narcotic usage. Questions and concerns were addressed, he verbalized understanding of today's conversation. Plan:   3 This is stable chronic illness/condition  Treatment at this time:  New sterile dressing was reapplied to the patient's incision. Continue to be nonweightbearing in the cam walker boot. Needed to elevate the affected extremity for swelling. He still not allowed to get the affected extremity wet. Patient will try to get by with Tylenol as pain management.   He will follow-up on October 17 for his original postoperative visit for suture removal.  Studies ordered: NO XR needed @ Next Visit    Weight-bearing status: NWB        Return to work/work restrictions: none  No medications given

## 2022-10-17 ENCOUNTER — OFFICE VISIT (OUTPATIENT)
Dept: ORTHOPEDIC SURGERY | Age: 57
End: 2022-10-17

## 2022-10-17 DIAGNOSIS — M76.61 ACHILLES TENDINITIS, RIGHT LEG: Primary | ICD-10-CM

## 2022-10-17 PROCEDURE — 99024 POSTOP FOLLOW-UP VISIT: CPT | Performed by: NURSE PRACTITIONER

## 2022-10-17 RX ORDER — ASPIRIN 81 MG/1
81 TABLET ORAL 2 TIMES DAILY
Qty: 42 TABLET | Refills: 0 | OUTPATIENT
Start: 2022-10-17

## 2022-10-17 NOTE — PROGRESS NOTES
Name: Dia Brittle  YOB: 1965  Gender: male  MRN: [id number]    Procedure Performed:  Right secondary repair of Achilles tendon with debridement and reconstruction  Right flexor hallucis longus tendon transfer  Right Anisha's deformity excision of calcaneus        Date of Procedure: 09/29/2022       Subjective: Patient notes that he is feeling much better, calf pain has resolved and he is very happy about progressing with his recovery. Physical Examination: Patient's incision continues to heal well and shows no signs of infection. His skin is warm and dry and he has palpable pulses and intact sensation to the foot. There is a moderate amount of swelling to the posterior ankle at both medial and lateral sides of the incision. Upon palpation to the incisional line the repair of the Achilles is intact. He has no signs of DVT at this time, denies have any calf pain or behind the knee pain and presents with a negative Wolfe Colonel' sign. Range of motion to the ankle joint was performed without difficulty or stiffness. Imaging:   No imaging reviewed          Assessment:   Status post right secondary Achilles reconstruction with FHL tendon transfer and Anisha's excision. We discussed progression of care to include his return to work as well as his ability to bear weight in the boot and his extent of physical activity. His questions and concerns were addressed, he verbalized understanding of today's conversation. Plan:   3 This is stable chronic illness/condition  Treatment at this time:  Sutures removed, Steri-Strips were placed. Patient will continue to wear the walker boot as a matter medical necessity however he may now progressively bear weight on the affected extremity as he can tolerate and swelling allows. He was encouraged to continue elevating affected extremity for swelling. He may return to work starting October 24, see below restrictions.   He may discontinue his daily aspirin therapy as DVT prophylaxis. He may now get the affected extremity wet including showering and soaking as needed, recommendations Epsom salts was given to help with additional incisional healing as well as swelling purposes. We will continue to limit his activity level at this time. He may begin to perform open chain range of motion exercises while in the boot. He will follow-up in 4 weeks or sooner if needed. Studies ordered: NO XR needed @ Next Visit    Weight-bearing status: WBAT in Boot/hardsole shoe        Return to work/work restrictions:  Patient will return to work October 24, 2022 with the restrictions that he is to wear the boot at all times, and he also has the ability to drive.   No medications given

## 2022-10-27 ENCOUNTER — TELEPHONE (OUTPATIENT)
Dept: ORTHOPEDIC SURGERY | Age: 57
End: 2022-10-27

## 2022-10-27 NOTE — TELEPHONE ENCOUNTER
Pt foot is swollen and painful pt wants a call to know if he should take any medication or come in for  to see him.

## 2022-10-28 NOTE — TELEPHONE ENCOUNTER
Spoke with pt he states the swelling is from being on his feet while at work he is taking Advil that did help. Pt is wearing boot with lift.  Aware to elevate foot during day if possible and to get a compression sock as well

## 2022-11-14 ENCOUNTER — OFFICE VISIT (OUTPATIENT)
Dept: ORTHOPEDIC SURGERY | Age: 57
End: 2022-11-14

## 2022-11-14 DIAGNOSIS — M76.61 ACHILLES TENDINITIS, RIGHT LEG: Primary | ICD-10-CM

## 2022-11-14 PROCEDURE — 99024 POSTOP FOLLOW-UP VISIT: CPT | Performed by: NURSE PRACTITIONER

## 2022-11-14 NOTE — PROGRESS NOTES
Name: Danielito Dorantes  YOB: 1965  Gender: male  MRN: [id number]    Procedure Performed:  Right secondary repair of Achilles tendon with debridement and reconstruction  Right flexor hallucis longus tendon transfer  Right Anisha's deformity excision of calcaneus        Date of Procedure: 09/29/2022    Subjective: Patient notes that he has done well since his last visit. He reports still having swelling on a daily basis and that his pain is currently managed with just Tylenol and ibuprofen as needed. Physical Examination: Incision appears to be well-healed there are no signs of infection. Upon palpation to the incisional line, the Achilles repair is intact. He has 5 of 5 strength to the Achilles repair. He has palpable pulses and intact sensation to the foot. Range of motion to the ankle joint was performed without difficulty, expected stiffness, and no pain with extreme dorsiflexion. He has moderate swelling to the posterior ankle as well as both medial and lateral sides of the incisional line. Imaging:   No imaging reviewed          Assessment:   Status post right secondary Achilles reconstruction with FHL tendon transfer and Anisha's excision. Plan:   3 This is stable chronic illness/condition  Treatment at this time:  Patient may begin wean from cam walker boot back in a comfortable shoes as he can tolerate and swelling allows. He was encouraged to elevate the affected extremity still for swelling, he may also use compression socks as needed for swelling. He may resume physical activities at this time excluding high-impact such as running and jumping. He will continue to soak the affected extremity with Epsom salts for swelling and pain purposes. He will start physical therapy, an order was given for this today as well as a list of locations for him to choose from.   He will also start a diligent home exercise program to help increase strength and mobility of the Achilles. He will follow-up in 3 months or sooner if needed.   Studies ordered: NO XR needed @ Next Visit    Weight-bearing status: WBAT        Return to work/work restrictions: none  No medications given

## 2022-11-16 ENCOUNTER — HOSPITAL ENCOUNTER (OUTPATIENT)
Dept: PHYSICAL THERAPY | Age: 57
Setting detail: RECURRING SERIES
Discharge: HOME | End: 2022-11-19
Payer: COMMERCIAL

## 2022-11-16 PROCEDURE — 97110 THERAPEUTIC EXERCISES: CPT

## 2022-11-16 PROCEDURE — 97161 PT EVAL LOW COMPLEX 20 MIN: CPT

## 2022-11-16 NOTE — THERAPY EVALUATION
Porfirio Dawn [facility]  : 1965  Primary: [address]:  [address] @ [address] [address]  Phone: [phone]  Fax: [phone] Plan Frequency: 1-2 visits per week for 10 weeks  Plan of Care/Certification Expiration Date: 23    PT Visit Info:         Visit Count:  1                OUTPATIENT PHYSICAL THERAPY:             OP NOTE TYPE: Initial Assessment 2022               Episode  Appt Desk         Treatment Diagnosis:  Difficulty in walking, Not elsewhere classified (R26.2)  Medical/Referring Diagnosis:  Achilles tendinitis, right leg [M76.61]  Referring Physician:  Kenney Le APRN - CNP MD Orders:  PT Eval and Treat   Return MD Appt:  2022  Date of Onset:  Onset Date: 22 (Surgery)    Allergies:  Patient has no known allergies. Restrictions/Precautions:    Restrictions/Precautions: None      Medications Last Reviewed:  2022     SUBJECTIVE   History of Injury/Illness (Reason for Referral):  Patient underwent surgery on 22 to address an osteophyte to R calcaneus and chronic tendinosis, with a R secondary repair of Achilles' tendon with debridement and reconstruction with R flexor hallucis longus tendon transfer. [facility] that he removed the walking boot on Monday (22). He works as a  and walks often throughout the day visiting different companies. He wants to be able to walk without pain or difficulty, and go up and downstairs with a reciprocal gait pattern. Patient Stated Goal(s):  \"Walk normally without pain\"  Initial:      (No pain, just sore/stiff)/10 Post Session:      (No pain rated)/10  Past Medical History/Comorbidities: see EMR  [facility]  has a past medical history of COVID-19. [facility]  has a past surgical history that includes Colonoscopy; orthopedic surgery (Right);  Cholecystectomy; and Achilles tendon surgery (Right, 9/29/2022). Social History/Living Environment:   Lives With: Spouse  Home Layout: Two level     Prior Level of Function/Work/Activity:   Prior level of function: Independent  Occupation: Full time employment  Type of Occupation: Corporate      Education: >4 years of college     Learning:   Does the patient/guardian have any barriers to learning?: No barriers  Will there be a co-learner?: No  What is the preferred language of the patient/guardian?: English  Is an  required?: No  How does the patient/guardian prefer to learn new concepts?: Listening; Demonstration; Reading     Fall Risk Scale: Morris Total Score: 0  Morris Fall Risk: Low (0-24)         OBJECTIVE   Observations:    Description: Ambulates with forwad trunk lean. Reduced stance time on R LE. Reduced push off on R LE. Ascends stairs with reciprocal gait pattern. Descends with reduced eccentric control on R LE when using reciprocal gait pattern. Incision to R achilles' healed and intact. Knee:  AROM Knee (Degrees)  General AROM LE: Right WFL, Left WFL  Strength Testing (MMT)  R Ankle Dorsiflexion: 5/5  L Ankle Dorsiflexion: 5/5  Foot/Ankle:  AROM Ankle (Degrees)  R Ankle Dorsiflexion 0-20: 14  R Ankle Plantar Flexion 0-45: 38  R Ankle Forefoot Inversion 0-40: 40  R Ankle Forefoot Eversion 0-20: 18  L Ankle Dorsiflexion 0-20: 14  L Ankle Plantar Flexion 0-45: 45  L Ankle Forefoot Inversion 0-40: 40  L Ankle Forefoot Eversion 0-20: 18  Ankle Strength Testing (MMT)  R Ankle Dorsiflexion: 5/5  L Ankle Dorsiflexion: 5/5        R ankle Inversion 5/5        L ankle inversion 5/5        R ankle eversion 5/5        L ankle eversion 5/5        Patient able to complete 15 repetitions of unilateral calf raises on L LE; unable to complete any unilateral calf raises on R LE. Balance in single leg on R LE limited with increased postural sway. Approximately 10 seconds before requiring UE support.   ASSESSMENT   Initial Assessment:  Patient presents s/p R achilles' tendon reconstruction with R hallucis longus tendon transfer. He exhibits reduced R plantarflexor strength, impaired balance in on R LE and altered gait mechanics secondary to surgery and subsequent weakness. Patient is likely to benefit from PT intervention in order to improve R foot/ankle strength, balance on R LE, and improved gait performance. Problem List: (Impacting functional limitations): Body Structures, Functions, Activity Limitations Requiring Skilled Therapeutic Intervention: Decreased functional mobility ; Decreased ROM; Decreased strength; Decreased balance; Increased pain     Therapy Prognosis:   Therapy Prognosis: Good     Initial Assessment Complexity:   Decision Making: Low Complexity    PLAN   Effective Dates: 11/16/2022 TO Plan of Care/Certification Expiration Date: 01/27/23   Frequency/Duration: Plan Frequency: 1-2 visits per week for 10 weeks   Interventions Planned (Treatment may consist of any combination of the following):    Current Treatment Recommendations: Strengthening; ROM; Balance training; Neuromuscular re-education; Manual Therapy - Soft Tissue Mobilization; Manual Therapy - Joint Manipulation; Home exercise program; Patient/Caregiver education & training; Modalities; Integrated dry needling     Goals: (Goals have been discussed and agreed upon with patient.)  Short-Term Functional Goals: Time Frame: 5 weeks  Patient will improve score on LEFS to 60/80. Patient will be able to perform 5 unilateral repetitions of calf raises on R LE. Patient will ambulate without antalgic gait pattern on level surfaces. Patient will be able to stand on R LE in single leg stance x 30 seconds. Discharge Goals: Time Frame: 10 weeks  Patient will be able to perform 15 unilateral calf raises on R LE. Patient will be able to ascend/descend stairs with reciprocal gait pattern and good eccentric control on R LE.   Patient will be able to walk with work duties with <3/10 R LE pain.  Patient will be independent with HEP. Outcome Measure: Tool Used: Lower Extremity Functional Scale (LEFS)  Score:  Initial: 49/80 Most Recent: X/80 (Date: -- )   Interpretation of Score: 20 questions each scored on a 5 point scale with 0 representing \"extreme difficulty or unable to perform\" and 4 representing \"no difficulty\". The lower the score, the greater the functional disability. 80/80 represents no disability. Minimal detectable change is 9 points. Medical Necessity:   > Skilled intervention continues to be required due to R foot/ankle weakness and gait deficits secondary to R achilles' tendon repair. Reason For Services/Other Comments:  > Patient continues to require skilled intervention due to R achilles' tendon repair and subsequent deficits in gait and mobility. Regarding Amarilys Parisi's therapy, I certify that the treatment plan above will be carried out by a therapist or under their direction. Thank you for this referral,    Savanna Yan, PT       Referring Physician Signature: Esther Saha, * No Signature is Required for this note.         Post Session Pain  Charge Capture  PT Visit Info MD Donte Helms

## 2022-11-17 NOTE — PROGRESS NOTES
Tiara Chaudhary [facility]  : 1965  Primary: [address]:  Álvaro Iverson @ [address] [address]  Phone: [phone]  Fax: [phone] Plan Frequency: 1-2 visits per week for 10 weeks    Plan of Care/Certification Expiration Date: 23      PT Visit Info:  No data recorded   Visit Count:  1   OUTPATIENT PHYSICAL THERAPY:OP NOTE TYPE: Treatment Note 2022       Episode  }Appt Desk             Treatment Diagnosis:  Difficulty in walking, Not elsewhere classified (R26.2)  Medical/Referring Diagnosis:  Achilles tendinitis, right leg [M76.61]  Referring Physician:  Ruddy Tucker APRN - CNP MD Orders:  PT Eval and Treat   Date of Onset:  Onset Date: 22 (Surgery)     Allergies:   Patient has no known allergies. Restrictions/Precautions:  Restrictions/Precautions: None  No data recorded     Interventions Planned (Treatment may consist of any combination of the following):    Current Treatment Recommendations: Strengthening; ROM; Balance training; Neuromuscular re-education; Manual Therapy - Soft Tissue Mobilization; Manual Therapy - Joint Manipulation; Home exercise program; Patient/Caregiver education & training; Modalities; Integrated dry needling     Subjective Comments:  See evaluation note for details. Initial:}     (No pain, just sore/stiff)/10Post Session:        (No pain rated)/10  Medications Last Reviewed:  2022  Updated Objective Findings:  See evaluation note from today  Treatment   THERAPEUTIC EXERCISE: (10 minutes):    Passive range of motion to R foot/ankle for reduced stiffness and discomfort. Ankle alphabet x 1 performed with patient in sitting. Calf stretches and seated calf raises. Passive R great toe extensions performed. HEP: Patient received handout for the exercises above. Treatment/Session Summary:    Treatment Assessment:  Good performance with exercises.  Altered gait mechanics per observations in evaluation note. Communication/Consultation:  None today  Equipment provided today:  None  Recommendations/Intent for next treatment session: Next visit will focus on improving gait and R LE strength/motion.     Total Treatment Billable Duration:  10 minutes for therapeutic exercises + 30 minutes for evaluation  Time In: 0915  Time Out: Ned, PT       Charge Capture  }Post Session Pain  PT Visit Info  [employer] Portal  MD Guidelines  Scanned Media  Benefits  MyChart    Future Appointments   Date Time Provider Carson Don   11/23/2022 10:30 AM Zacahry Condon, PT [facility]   11/30/2022  1:45 PM Veronica Mckinney, PTA [facility]   12/2/2022 12:30 PM Nenita Partida, PT [facility]   12/7/2022  1:00 PM Geetha Vail, PT [facility]   12/9/2022 12:30 PM Budylan Partida, PT [facility]   12/12/2022  2:30 PM Nenita Partida, PT [facility]   12/14/2022  2:30 PM Nenita Partida, PT [facility]   2/20/2023  9:20 AM RENAE Hamilton - CNP [facility]

## 2022-11-23 ENCOUNTER — HOSPITAL ENCOUNTER (OUTPATIENT)
Dept: PHYSICAL THERAPY | Age: 57
Setting detail: RECURRING SERIES
Discharge: HOME | End: 2022-11-26
Payer: COMMERCIAL

## 2022-11-23 PROCEDURE — 97110 THERAPEUTIC EXERCISES: CPT

## 2022-11-23 NOTE — PROGRESS NOTES
Tawana Longoria  : 1965  Primary: [address]:  Olayinka Mca @ [address] [place] [address] Way [address]  Phone: [phone]  Fax: [phone] Plan Frequency: 1-2 visits per week for 10 weeks    Plan of Care/Certification Expiration Date: 23      PT Visit Info: Total # of Visits to Date: 2  Progress Note Counter: 2   Visit Count:  2   OUTPATIENT PHYSICAL THERAPY:OP NOTE TYPE: Treatment Note 2022       Episode  }Appt Desk             Treatment Diagnosis:  Difficulty in walking, Not elsewhere classified (R26.2)  Medical/Referring Diagnosis:  Achilles tendinitis, right leg [M76.61]  Referring Physician:  Dodson Bloom, Darlen Boxer, APRN - CNP MD Orders:  PT Eval and Treat   Date of Onset:  Onset Date: 22 (Surgery)     Allergies:   Patient has no known allergies. Restrictions/Precautions:  Restrictions/Precautions: None  No data recorded    Interventions Planned (Treatment may consist of any combination of the following):    Current Treatment Recommendations: Strengthening; ROM; Balance training; Neuromuscular re-education; Manual Therapy - Soft Tissue Mobilization; Manual Therapy - Joint Manipulation; Home exercise program; Patient/Caregiver education & training; Modalities; Integrated dry needling     Subjective Comments: Says his achilles is just sore. Swelling persists to the ankle. Has been working on the ankle AROM and stretch into dorsiflexion. Gets sore with prolonged standing and walking. Is on his feet a lot on Mon and Tues for work. Initial:}  No VAS       Post Session:          Medications Last Reviewed:  2022  Updated Objective Findings:    No new measure. Treatment   MANUAL THERAPY:(5 minutes): Brief myofascial scar release and mobilitzation for scar restrictions.    THERAPEUTIC EXERCISE: (40 minutes): Exercises for R ankle motion with passive hold/relax whole foot dorsiflexion, plantarflexion, and rearfoot inversion and eversion, 30\"x3 each; assisted Active Isolated Stretch to soleus, gastroc, dorsal foot, ankle invertors and evertors, 3\"x10 each, and Assisted Active Isolated Stretching to R gastroc., posterior hip, lateral hip, hamstrings in 90/90 and SLR, and hip flexors and quads in side-lying, 3\"x10 each. Exercises for ankle strength with blue band resisted plantarflexion, inv, and eversion with instruction for home practice, x15 each; standing bilat ankle dorsiflexion x10; and added standing control with narrow stance control sways in frontal and sagittal planes; and added pre-gait control shifts in L and R stride progressing to single stepping with emphasis on ankle mechanics x15+/- to each. Good return demonstration for HEP practice. HEP: He is to continue with his motion exercisetient received handout for the exercises above. Treatment/Session Summary:    Treatment Assessment: There is mlld swelling noted to the R ankle and lower leg. Mild scar restriction. Good ankle ROM. And good performance of the exercise today. Communication/Consultation:  None today  Equipment provided today:  None  Recommendations/Intent for next treatment session: Next visit will focus on  ankle ROM, progressive ankle and whole LE strength, balance and control, and gait essential components. Review and update HEP.      Total Treatment Billable Duration:  45 minutes  Time In: 8408  Time Out: 435 [facility], PT       Charge Capture  }Post Session Pain  PT Visit Info  [employer] Portal  MD Guidelines  Scanned Media  Benefits  MyChart    Future Appointments   Date Time Provider Carson Don   11[facility]30[facility]2022  [facility]   12/2/2022 12:30 PM Bill Calero PT [facility]   12/7/2022  1:00 PM JOSELYN HernandezORPTLEONARD [facility]   12/9/2022 12:30 PM Bill Calero PT [facility]   12/12/2022  2:30 PM Bill Calero, PT [facility]   12/14/2022  2:30 PM Varghese [facility], PT [facility]   2/20/2023  9:20  Lexx Renee, APRN - CNP [facility]

## 2022-11-30 ENCOUNTER — HOSPITAL ENCOUNTER (OUTPATIENT)
Dept: PHYSICAL THERAPY | Age: 57
Setting detail: RECURRING SERIES
Discharge: HOME | End: 2022-12-03
Payer: COMMERCIAL

## 2022-11-30 PROCEDURE — 97110 THERAPEUTIC EXERCISES: CPT

## 2022-11-30 PROCEDURE — 97140 MANUAL THERAPY 1/> REGIONS: CPT

## 2022-11-30 NOTE — PROGRESS NOTES
Danielito Dorantes  : 1965  Primary: [address]:  [address] @ [address] [address]  Phone: [phone]  Fax: [phone] Plan Frequency: 1-2 visits per week for 10 weeks    Plan of Care/Certification Expiration Date: 23      PT Visit Info: Total # of Visits to Date: 3  Progress Note Counter: 3     Visit Count:  3   OUTPATIENT PHYSICAL THERAPY:OP NOTE TYPE: Treatment Note 2022       Episode  }Appt Desk             Treatment Diagnosis:  Difficulty in walking, Not elsewhere classified (R26.2)  Medical/Referring Diagnosis:  Achilles tendinitis, right leg [M76.61]  Referring Physician:  Tyler Ramirez APRN - CNP MD Orders:  PT Eval and Treat   Date of Onset:  Onset Date: 22 (Surgery)     Allergies:   Patient has no known allergies. Restrictions/Precautions:  Restrictions/Precautions: None  No data recorded    Interventions Planned (Treatment may consist of any combination of the following):    Current Treatment Recommendations: Strengthening; ROM; Balance training; Neuromuscular re-education; Manual Therapy - Soft Tissue Mobilization; Manual Therapy - Joint Manipulation; Home exercise program; Patient/Caregiver education & training; Modalities; Integrated dry needling     Subjective Comments: Pt states \"I had a lot of knee after the knee stretches. It became very swollen but I stayed off it. I had surgery on this knee years ago. \"  Initial:}  No VAS pt reports more pain in the R knee       Post Session:        No ankle pain, just knee pain post treatment  Medications Last Reviewed:  2022  Updated Objective Findings:    Increased edema surrounding the R patella when compared to the left  Treatment   MANUAL THERAPY:(15 minutes): myofascial scar release and mobilitzation for scar restrictions. Soft tissue mobilization to muscle belly of gastrocnemius.     THERAPEUTIC EXERCISE: (25 minutes): Gentle stretching with mild overpressure into dorsiflexion, plantarflexion, inversion and eversion to increase ROM. See grid below. Date:  11/30/22 Date:   Date:     Activity/Exercise Parameters Parameters Parameters   4 way ankle band 3x10 ea direction, green                                               HEP: He is to continue with his motion exercisetient received handout for the exercises above. Treatment/Session Summary:    Treatment Assessment: The R knee has more edema compared to the left and no standing exercises were performed due to increased knee pain. Assess next visit. Pt states that he had knee pain in the boot and also some clicking and locking of the knee joint prior to starting therapy. He states that he has an appointment with Dr. Julia Schaefer in January. He had no difficulty with ankle exercises. Communication/Consultation:  None today  Equipment provided today:  None  Recommendations/Intent for next treatment session: Next visit will focus on  ankle ROM, progressive ankle and whole LE strength, balance and control, and gait essential components. Review and update HEP.      Total Treatment Billable Duration:  40 minutes  Time In: 1460  Time Out: 1430    Veronica Mckinney PTA       Charge Capture  }Post Session Pain  PT Visit Info  [employer] Portal  MD Guidelines  Scanned Media  Benefits  MyChart    Future Appointments   Date Time Provider Carson Don   12/2/2022 12:30 PM Nenita Partida, PT [facility]   12/7/2022  1:00 PM Geetha Vail, PT [facility]   12/9/2022 12:30 PM Nenita Partida, PT [facility]   12/12/2022  2:30 PM Nenita Partida, PT [facility]   12/14/2022  2:30 PM Nenita Partida, PT [facility]   2/20/2023  9:20 AM RENAE Marroquin - [facility]

## 2022-12-02 ENCOUNTER — HOSPITAL ENCOUNTER (OUTPATIENT)
Dept: PHYSICAL THERAPY | Age: 57
Setting detail: RECURRING SERIES
End: 2022-12-02
Payer: COMMERCIAL

## 2022-12-07 ENCOUNTER — HOSPITAL ENCOUNTER (OUTPATIENT)
Dept: PHYSICAL THERAPY | Age: 57
Setting detail: RECURRING SERIES
Discharge: HOME | End: 2022-12-10
Payer: COMMERCIAL

## 2022-12-07 PROCEDURE — 97110 THERAPEUTIC EXERCISES: CPT

## 2022-12-07 PROCEDURE — 97140 MANUAL THERAPY 1/> REGIONS: CPT

## 2022-12-07 NOTE — PROGRESS NOTES
Stephanie Vergara  : 1965  Primary: [address]:  [address] @ [address] [address]  Phone: [phone]  Fax: [phone] Plan Frequency: 1-2 visits per week for 10 weeks    Plan of Care/Certification Expiration Date: 23      PT Visit Info: Total # of Visits to Date: 4  Progress Note Counter: 4     Visit Count:  4   OUTPATIENT PHYSICAL THERAPY:OP NOTE TYPE: Treatment Note 2022       Episode  }Appt Desk             Treatment Diagnosis:  Difficulty in walking, Not elsewhere classified (R26.2)  Medical/Referring Diagnosis:  Achilles tendinitis, right leg [M76.61]  Referring Physician:  Rosemary Aj APRN - CNP MD Orders:  PT Eval and Treat   Date of Onset:  Onset Date: 22 (Surgery)     Allergies:   Patient has no known allergies. Restrictions/Precautions:  Restrictions/Precautions: None  No data recorded    Interventions Planned (Treatment may consist of any combination of the following):    Current Treatment Recommendations: Strengthening; ROM; Balance training; Neuromuscular re-education; Manual Therapy - Soft Tissue Mobilization; Manual Therapy - Joint Manipulation; Home exercise program; Patient/Caregiver education & training; Modalities; Integrated dry needling     Subjective Comments: Pt reports he had wrist surgery this week for a tendon issue. The ankle/foot is feeling better. Initial:}         Post Session:        no VAS  Medications Last Reviewed:  2022  Updated Objective Findings:      Treatment   MANUAL THERAPY:(25 minutes): myofascial scar release and mobilitzation for scar restrictions with pt prone. Tool assisted mobilization around heel cord with hawk  tools. THERAPEUTIC EXERCISE: (15 minutes):exercises per grid below. Practiced gait with verbal cues for push on on R LE.       Date:  22 Date:  22 Date:     Activity/Exercise Parameters Parameters Parameters   4 way ankle band 3x10 ea direction, green Verbally reviewed    Heel raises - Standing 2x10    Weight shifts forward and backward - 10 ea    Balance - 20-30\"x3                             HEP: He can add balance and bilateral feet heel raises    Treatment/Session Summary:    Treatment Assessment: Weakness to R gastroc but as expected after surgery. Improved gait after working on [employer] and verbal cueing. Communication/Consultation:  None today  Equipment provided today:  None  Recommendations/Intent for next treatment session: Next visit will focus on  ankle ROM, progressive ankle and whole LE strength, balance and control, and gait essential components. Review and update HEP.      Total Treatment Billable Duration:  40 minutes  Time In: 1300  Time Out: 4056    Phu Nathan PT       Charge Capture  }Post Session Pain  PT Visit Info  [employer] Portal  MD Guidelines  Scanned Media  Benefits  MyChart    Future Appointments   Date Time Provider Carson Don   12/9/2022 12:30 PM Akbar Moss, PT [facility]   12/12/2022  2:30 PM Akbar Moss PT [facility]   12/14/2022  2:30 PM Akbar Moss PT [facility]   2/20/2023  9:20 AM RENAE Moraes - [facility]

## 2022-12-09 ENCOUNTER — HOSPITAL ENCOUNTER (OUTPATIENT)
Dept: PHYSICAL THERAPY | Age: 57
Setting detail: RECURRING SERIES
Discharge: HOME | End: 2022-12-12
Payer: COMMERCIAL

## 2022-12-09 PROCEDURE — 97110 THERAPEUTIC EXERCISES: CPT

## 2022-12-09 NOTE — PROGRESS NOTES
Georgie Hunter  : 1965  Primary: [address] [place]:  [address] @ [address] [address]  Phone: [phone]  Fax: [phone] Plan Frequency: 1-2 visits per week for 10 weeks    Plan of Care/Certification Expiration Date: 23      PT Visit Info: Total # of Visits to Date: 5  Progress Note Counter: 4     Visit Count:  5   OUTPATIENT PHYSICAL THERAPY:OP NOTE TYPE: Treatment Note 2022       Episode  }Appt Desk             Treatment Diagnosis:  Difficulty in walking, Not elsewhere classified (R26.2)  Medical/Referring Diagnosis:  Achilles tendinitis, right leg [M76.61]  Referring Physician:  Nic Lujan APRN - CNP MD Orders:  PT Eval and Treat   Date of Onset:  Onset Date: 22 (Surgery)     Allergies:   Patient has no known allergies. Restrictions/Precautions:  Restrictions/Precautions: None  No data recorded    Interventions Planned (Treatment may consist of any combination of the following):    Current Treatment Recommendations: Strengthening; ROM; Balance training; Neuromuscular re-education; Manual Therapy - Soft Tissue Mobilization; Manual Therapy - Joint Manipulation; Home exercise program; Patient/Caregiver education & training; Modalities; Integrated dry needling     Subjective Comments:   really paying attention to how I walk, especially for work. Balance is my most challenging thing  Initial:}       no number. \"Not painful, more tight\"  Post Session:        no pain  Medications Last Reviewed:  2022  Updated Objective Findings:   Minimal antalgia with decreased DF at pre swing     Treatment   MANUAL THERAPY:( minutes): none     THERAPEUTIC EXERCISE: ( 40  minutes):exercises per grid below for strength, proprioception and stretching. Practiced gait with verbal cues for push on on R LE.    Gait training:B side stepping, backward ambulation, tandem forward  6x15ft, changes on command Date:  11/30/22 Date:  12-8-22 Date:  12/9/22   Activity/Exercise Parameters Parameters Parameters   4 way ankle band 3x10 ea direction, green Verbally reviewed    Heel raises - Standing 2x10 Standing forward lean x10;  standing heel raise x10;  Seated 10# 2x10   Weight shifts forward and backward - 10 ea    Balance - 20-30\"x3  L Cone Step over 2x5;  Standing L   4 cone tap x6;  B side step over cones 4x5;  Static stance on rocker board    LAQ   4# holding ant pelvic tilt 2x10   Step up   Forward 2# B to 8\" 4x5               HEP:continue current program    Treatment/Session Summary:    Treatment Assessment:   good tolerance to exercises. Decreased single leg stance balance  Communication/Consultation:  None today  Equipment provided today:  None  Recommendations/Intent for next treatment session: Next visit will focus on  ankle ROM, progressive ankle and whole LE strength, balance and control, and gait essential components. Review and update HEP.      Total Treatment Billable Duration:  40 minutes  Time In: 5686  Time Out: [address], PT       Charge Capture  }Post Session Pain  PT Visit Info  [employer] Portal  MD Guidelines  Scanned Media  Benefits  MyChart    Future Appointments   Date Time Provider Carson Don   12/12/2022  2:30  Home Andreas Choi, PT [facility]   12/14/2022  2:30 PM Lily Beck, PT [facility]   2/20/2023  9:20 AM Eldon Wells APRN - [facility]

## 2022-12-12 ENCOUNTER — HOSPITAL ENCOUNTER (OUTPATIENT)
Dept: PHYSICAL THERAPY | Age: 57
Setting detail: RECURRING SERIES
End: 2022-12-12
Payer: COMMERCIAL

## 2022-12-14 ENCOUNTER — HOSPITAL ENCOUNTER (OUTPATIENT)
Dept: PHYSICAL THERAPY | Age: 57
Setting detail: RECURRING SERIES
Discharge: HOME | End: 2022-12-17
Payer: COMMERCIAL

## 2022-12-14 PROCEDURE — 97110 THERAPEUTIC EXERCISES: CPT

## 2022-12-14 PROCEDURE — 97035 APP MDLTY 1+ULTRASOUND EA 15: CPT

## 2022-12-14 NOTE — PROGRESS NOTES
Tawana Longoria  : 1965  Primary: [address]:  Olayinka Mac @ [address] [place] [address] Way [address]  Phone: [phone]  Fax: [phone] Plan Frequency: 1-2 visits per week for 10 weeks    Plan of Care/Certification Expiration Date: 23      PT Visit Info: Total # of Visits to Date: 6  Progress Note Counter: 4     Visit Count:  6   OUTPATIENT PHYSICAL THERAPY:OP NOTE TYPE: Treatment Note 2022       Episode  }Appt Desk             Treatment Diagnosis:  Difficulty in walking, Not elsewhere classified (R26.2)  Medical/Referring Diagnosis:  Achilles tendinitis, right leg [M76.61]  Referring Physician:  Ely Das APRN - CNP MD Orders:  PT Eval and Treat   Date of Onset:  Onset Date: 22 (Surgery)     Allergies:   Patient has no known allergies. Restrictions/Precautions:  Restrictions/Precautions: None  No data recorded    Interventions Planned (Treatment may consist of any combination of the following):    Current Treatment Recommendations: Strengthening; ROM; Balance training; Neuromuscular re-education; Manual Therapy - Soft Tissue Mobilization; Manual Therapy - Joint Manipulation; Home exercise program; Patient/Caregiver education & training; Modalities; Integrated dry needling     Subjective Comments:   really paying attention to how I walk, especially for work. Balance is my most challenging thing  Initial:}       no number. \"Not painful, more tight\"  Post Session:        no pain  Medications Last Reviewed:  2022  Updated Objective Findings:   Loss of balance with quick direction changes when wearing athletic shoes  Treatment   MANUAL THERAPY:( minutes): none   Modalities (9 minutes): continuous ultrasound at 2.0 w/cm2 to right achilles tendon incision scar for mechanical effects on the tissue prior to exercise. Tool assisted scar tissue release along incision prior to stretching.   THERAPEUTIC EXERCISE: ( 33  minutes):exercises per grid below for strength, proprioception and stretching. Practiced gait with verbal cues for push on on R LE. Gait training:in sock feet B side stepping and braiding, tandem forward  4x35ft,   Date:  12/9/22 12/14/22   Activity/Exercise Parameters    B braiding  2x 30ft   Heel raises Standing forward lean x10;  standing heel raise x10;  Seated 10# 2x10   Standing raise on floor x10,  With feet suspended over 2\" step x10   Weight shifts forward and backward  Standing forward lean x10   Balance L Cone Step over 2x5;  Standing L   4 cone tap x6;  B side step over cones 4x5;  Static stance on rocker board  L cone step over 2x5,  L 4 cone tap 2x5,   B side step over cones 4x5,    Rocker board x30 sec     LAQ 4# holding ant pelvic tilt 2x10 5# x20   Step up Forward 2# B to 8\" 4x5 Quick to 4\" B 4x10 ea              HEP:continue current program    Treatment/Session Summary:    Treatment Assessment:   good tolerance to exercises but tends to lose balance with slow reaction speed  Communication/Consultation:  None today  Equipment provided today:  None  Recommendations/Intent for next treatment session: pt will transfer to [facility] to continue treatment until the end of the month. Next visit will focus on functional progressive ankle strength, balance and control, and gait essential components. Review and update HEP.      Total Treatment Billable Duration:  42 minutes  Time In: 1430  Time Out: [address], PT       Charge Capture  }Post Session Pain  PT Visit Info  [employer] Portal  MD Guidelines  Scanned Media  Benefits  MyChart    Future Appointments   Date Time Provider Carson Don   12/22/2022  4:00 PM [facility]IAL [facility]ERAP[facility]   2/20/2023  9:20 AM RENAE Coker - [facility]

## 2023-01-03 NOTE — PROGRESS NOTES
Indiana Richards  : 1965  Primary: [address]:  [address] @ [address] [address]  Phone: [phone]  Fax: [phone]       PT Visit Info: Total # of Visits to Date: 7  Progress Note Counter: 4        OUTPATIENT PHYSICAL THERAPY:OP NOTE TYPE: Treatment Note 2022 and discontinuation       Episode  }Appt Desk             Treatment Diagnosis:  Difficulty in walking, Not elsewhere classified (R26.2)  Medical/Referring Diagnosis:  Achilles tendinitis, right leg [M76.61]  Referring Physician:  Adam Heath, RENAE - CNP  MD Orders:  PT Eval and Treat   Date of Onset:  Onset Date: 22 (Surgery)     Allergies:   Patient has no known allergies. Restrictions/Precautions:  Restrictions/Precautions: None  No data recorded    Interventions Planned (Treatment may consist of any combination of the following):    Current Treatment Recommendations: Strengthening; ROM; Balance training; Neuromuscular re-education; Manual Therapy - Soft Tissue Mobilization; Manual Therapy - Joint Manipulation; Home exercise program; Patient/Caregiver education & training; Modalities; Integrated dry needling     Subjective Comments:   really paying attention to how I walk, especially for work. Balance is my most challenging thing    (1/3/23) pt reports not being able to come to therapy because of having 100% out of pocket until large deductible is met. Doing well and the HEP without problems. Initial:}       no number. \"Not painful, more tight\"  Post Session:        no pain  Medications Last Reviewed:  2022  Updated Objective Findings:   Loss of balance with quick direction changes when wearing athletic shoes  Treatment 23   MANUAL THERAPY:( minutes): none   Modalities (9 minutes): continuous ultrasound at 2.0 w/cm2 to right achilles tendon incision scar for mechanical effects on the tissue prior to exercise.  Tool assisted scar tissue release along incision prior to stretching. THERAPEUTIC EXERCISE: ( 33  minutes):exercises per grid below for strength, proprioception and stretching. Practiced gait with verbal cues for push on on R LE. Gait training:in sock feet B side stepping and braiding, tandem forward  4x35ft,   Date:  12/9/22 12/14/22   Activity/Exercise Parameters    B braiding  2x 30ft   Heel raises Standing forward lean x10;  standing heel raise x10;  Seated 10# 2x10   Standing raise on floor x10,  With feet suspended over 2\" step x10   Weight shifts forward and backward  Standing forward lean x10   Balance L Cone Step over 2x5;  Standing L   4 cone tap x6;  B side step over cones 4x5;  Static stance on rocker board  L cone step over 2x5,  L 4 cone tap 2x5,   B side step over cones 4x5,    Rocker board x30 sec     LAQ 4# holding ant pelvic tilt 2x10 5# x20   Step up Forward 2# B to 8\" 4x5 Quick to 4\" B 4x10 ea              HEP:continue current program    Treatment/Session Summary:    Treatment Assessment:   good tolerance to exercises but tends to lose balance with slow reaction speed  (1/3/23) pt worked hard in treatment and was compliant with recommendations. Gait quality and pain level significantly improved with treatment. Unable to assess ROM, strength, gait, outcome tool or goals due to pt not returning. Recommendations: (1/3/23) Will discontinue due to pt not being able to afford returning.  Pt to continue HEP             Orestes Coreas PT , MSPT      Charge Capture  }Post Session Pain  PT Visit Info  [facility] Portal  MD Guidelines  Scanned Media  Benefits  MyChart    Future Appointments   Date Time Provider Carson Don   2/20/2023  9:20 AM Darrel Renee, APRN - CNP [facility]

## 2024-02-28 ENCOUNTER — OFFICE VISIT (OUTPATIENT)
Dept: FAMILY MEDICINE CLINIC | Facility: CLINIC | Age: 59
End: 2024-02-28
Payer: COMMERCIAL

## 2024-02-28 VITALS
HEART RATE: 87 BPM | WEIGHT: 238 LBS | SYSTOLIC BLOOD PRESSURE: 136 MMHG | BODY MASS INDEX: 35.15 KG/M2 | OXYGEN SATURATION: 96 % | DIASTOLIC BLOOD PRESSURE: 78 MMHG

## 2024-02-28 DIAGNOSIS — K21.9 GERD WITHOUT ESOPHAGITIS: ICD-10-CM

## 2024-02-28 DIAGNOSIS — Z87.442 HISTORY OF KIDNEY STONES: Primary | ICD-10-CM

## 2024-02-28 PROCEDURE — 99203 OFFICE O/P NEW LOW 30 MIN: CPT | Performed by: FAMILY MEDICINE

## 2024-02-28 NOTE — PROGRESS NOTES
SUBJECTIVE:   Kostas Parisi is a 58 y.o. male presents today requesting access to healthcare.  Patient is being referred at the courtesy of Dr. Bassem Velásquez.  Patient reports no active issues or complaints.  He would like to be set up for physical however.    PMH.  1 kidney stone that passed spontaneously and acid reflux that is infrequent and inconsistent.    PSH.  Right Achilles tendon repair, right wrist surgery, right knee surgery and cholecystectomy    Allergies.  None.    Social.  Denies tobacco or alcohol use.  Is .  Has 4 children.  Works as a [employer] .    Current meds.  None.    Family history.  Mother has Parkinson's.  Father  of an MI and had hypertension.  1 brother and 1 sister healthy.    HPI  See above    Past Medical History, Past Surgical History, Family history, Social History, and Medications were all reviewed with the patient today and updated as necessary.       Current Outpatient Medications   Medication Sig Dispense Refill    aspirin 81 MG EC tablet Take 1 tablet by mouth in the morning and at bedtime Preventative 42 tablet 0     No current facility-administered medications for this visit.     No Known Allergies  Patient Active Problem List   Diagnosis    Tendonitis, Achilles, right     Past Medical History:   Diagnosis Date    COVID-19 2022    patient reported- \"runny nose\"    GERD (gastroesophageal reflux disease)     Kidney stones      Past Surgical History:   Procedure Laterality Date    ACHILLES TENDON SURGERY Right 2022    right achilles secondary reconstruction/repair/Haglunds excision/possible flexor hallucis longus transfer performed by Marcin Chin III, MD at [facility]    CHOLECYSTECTOMY      COLONOSCOPY      ORTHOPEDIC SURGERY Right     knee surgery     Family History   Problem Relation Age of Onset    Parkinson's Disease Mother      Social History     Tobacco Use    Smoking status: Never    Smokeless tobacco: Never   Substance Use Topics    Alcohol

## 2024-04-04 ENCOUNTER — NURSE ONLY (OUTPATIENT)
Dept: FAMILY MEDICINE CLINIC | Facility: CLINIC | Age: 59
End: 2024-04-04
Payer: COMMERCIAL

## 2024-04-04 DIAGNOSIS — Z12.5 SPECIAL SCREENING FOR MALIGNANT NEOPLASM OF PROSTATE: ICD-10-CM

## 2024-04-04 DIAGNOSIS — Z11.59 NEED FOR HEPATITIS C SCREENING TEST: ICD-10-CM

## 2024-04-04 DIAGNOSIS — Z00.00 LABORATORY EXAM ORDERED AS PART OF ROUTINE GENERAL MEDICAL EXAMINATION: Primary | ICD-10-CM

## 2024-04-04 LAB
GRANS ABSOLUTE, POC: 5.6 K/UL
GRANULOCYTES %, POC: 56.8 %
HCV AB SER QL: NONREACTIVE
HEMATOCRIT, POC: 45.5 %
HEMOGLOBIN, POC: 14.5 G/DL
LYMPHOCYTE %, POC: 35.6 %
LYMPHS ABSOLUTE, POC: 3.5 K/UL
MCH, POC: 29.4 PG (ref 20–?)
MCHC, POC: 31.9
MCV, POC: 92.2
MONOCYTE %, POC: 7.6 %
MONOCYTE, ABSOLUTE POC: 0.8 K/UL
MPV, POC: 7 FL
PLATELET COUNT, POC: 270 K/UL
RBC, POC: 4.93 M/UL
RDW, POC: 12.5 %
WBC, POC: 9.9 K/UL

## 2024-04-04 PROCEDURE — 36415 COLL VENOUS BLD VENIPUNCTURE: CPT | Performed by: FAMILY MEDICINE

## 2024-04-04 PROCEDURE — 85025 COMPLETE CBC W/AUTO DIFF WBC: CPT | Performed by: FAMILY MEDICINE

## 2024-04-05 LAB
ALBUMIN SERPL-MCNC: 3.6 G/DL (ref 3.5–5)
ALBUMIN/GLOB SERPL: 1 (ref 0.4–1.6)
ALP SERPL-CCNC: 75 U/L (ref 50–136)
ALT SERPL-CCNC: 27 U/L (ref 12–65)
ANION GAP SERPL CALC-SCNC: 4 MMOL/L (ref 2–11)
AST SERPL-CCNC: 18 U/L (ref 15–37)
BILIRUB SERPL-MCNC: 0.3 MG/DL (ref 0.2–1.1)
BUN SERPL-MCNC: 18 MG/DL (ref 6–23)
CALCIUM SERPL-MCNC: 9.2 MG/DL (ref 8.3–10.4)
CHLORIDE SERPL-SCNC: 106 MMOL/L (ref 103–113)
CHOLEST SERPL-MCNC: 195 MG/DL
CO2 SERPL-SCNC: 29 MMOL/L (ref 21–32)
CREAT SERPL-MCNC: 1.1 MG/DL (ref 0.8–1.5)
GLOBULIN SER CALC-MCNC: 3.5 G/DL (ref 2.8–4.5)
GLUCOSE SERPL-MCNC: 102 MG/DL (ref 65–100)
HDLC SERPL-MCNC: 39 MG/DL (ref 40–60)
HDLC SERPL: 5
LDLC SERPL CALC-MCNC: ABNORMAL MG/DL
LDLC SERPL DIRECT ASSAY-MCNC: 116 MG/DL
POTASSIUM SERPL-SCNC: 4 MMOL/L (ref 3.5–5.1)
PROT SERPL-MCNC: 7.1 G/DL (ref 6.3–8.2)
PSA SERPL-MCNC: 1.4 NG/ML
SODIUM SERPL-SCNC: 139 MMOL/L (ref 136–146)
TRIGL SERPL-MCNC: 521 MG/DL (ref 35–150)
TSH, 3RD GENERATION: 1.96 UIU/ML (ref 0.36–3.74)
VLDLC SERPL CALC-MCNC: 104.2 MG/DL (ref 6–23)

## 2024-04-10 ASSESSMENT — PATIENT HEALTH QUESTIONNAIRE - PHQ9
1. LITTLE INTEREST OR PLEASURE IN DOING THINGS: NOT AT ALL
SUM OF ALL RESPONSES TO PHQ QUESTIONS 1-9: 0
SUM OF ALL RESPONSES TO PHQ9 QUESTIONS 1 & 2: 0
SUM OF ALL RESPONSES TO PHQ QUESTIONS 1-9: 0
2. FEELING DOWN, DEPRESSED OR HOPELESS: NOT AT ALL
1. LITTLE INTEREST OR PLEASURE IN DOING THINGS: NOT AT ALL
SUM OF ALL RESPONSES TO PHQ QUESTIONS 1-9: 0
SUM OF ALL RESPONSES TO PHQ QUESTIONS 1-9: 0
SUM OF ALL RESPONSES TO PHQ9 QUESTIONS 1 & 2: 0
2. FEELING DOWN, DEPRESSED OR HOPELESS: NOT AT ALL

## 2024-04-11 ENCOUNTER — OFFICE VISIT (OUTPATIENT)
Dept: FAMILY MEDICINE CLINIC | Facility: CLINIC | Age: 59
End: 2024-04-11
Payer: COMMERCIAL

## 2024-04-11 VITALS
BODY MASS INDEX: 33.74 KG/M2 | DIASTOLIC BLOOD PRESSURE: 80 MMHG | HEIGHT: 69 IN | SYSTOLIC BLOOD PRESSURE: 120 MMHG | WEIGHT: 227.8 LBS

## 2024-04-11 DIAGNOSIS — G89.29 CHRONIC RIGHT SHOULDER PAIN: ICD-10-CM

## 2024-04-11 DIAGNOSIS — M25.511 CHRONIC RIGHT SHOULDER PAIN: ICD-10-CM

## 2024-04-11 DIAGNOSIS — Z12.11 SCREENING FOR COLORECTAL CANCER: ICD-10-CM

## 2024-04-11 DIAGNOSIS — Z00.00 ROUTINE GENERAL MEDICAL EXAMINATION AT A HEALTH CARE FACILITY: Primary | ICD-10-CM

## 2024-04-11 DIAGNOSIS — J30.9 ALLERGIC RHINITIS, UNSPECIFIED SEASONALITY, UNSPECIFIED TRIGGER: ICD-10-CM

## 2024-04-11 DIAGNOSIS — E78.1 PURE HYPERGLYCERIDEMIA: ICD-10-CM

## 2024-04-11 DIAGNOSIS — Z12.12 SCREENING FOR COLORECTAL CANCER: ICD-10-CM

## 2024-04-11 PROCEDURE — 99396 PREV VISIT EST AGE 40-64: CPT | Performed by: FAMILY MEDICINE

## 2024-04-11 RX ORDER — DICLOFENAC SODIUM 75 MG/1
TABLET, DELAYED RELEASE ORAL
Qty: 20 TABLET | Refills: 0 | Status: SHIPPED | OUTPATIENT
Start: 2024-04-11

## 2024-04-11 RX ORDER — LEVOCETIRIZINE DIHYDROCHLORIDE 5 MG/1
5 TABLET, FILM COATED ORAL NIGHTLY
COMMUNITY

## 2024-04-11 RX ORDER — FLUTICASONE PROPIONATE 50 MCG
1 SPRAY, SUSPENSION (ML) NASAL DAILY
COMMUNITY
Start: 2024-03-28

## 2024-04-11 SDOH — ECONOMIC STABILITY: FOOD INSECURITY: WITHIN THE PAST 12 MONTHS, YOU WORRIED THAT YOUR FOOD WOULD RUN OUT BEFORE YOU GOT MONEY TO BUY MORE.: NEVER TRUE

## 2024-04-11 SDOH — ECONOMIC STABILITY: HOUSING INSECURITY
IN THE LAST 12 MONTHS, WAS THERE A TIME WHEN YOU DID NOT HAVE A STEADY PLACE TO SLEEP OR SLEPT IN A SHELTER (INCLUDING NOW)?: NO

## 2024-04-11 SDOH — ECONOMIC STABILITY: FOOD INSECURITY: WITHIN THE PAST 12 MONTHS, THE FOOD YOU BOUGHT JUST DIDN'T LAST AND YOU DIDN'T HAVE MONEY TO GET MORE.: NEVER TRUE

## 2024-04-11 SDOH — ECONOMIC STABILITY: INCOME INSECURITY: HOW HARD IS IT FOR YOU TO PAY FOR THE VERY BASICS LIKE FOOD, HOUSING, MEDICAL CARE, AND HEATING?: NOT VERY HARD

## 2024-04-11 NOTE — PROGRESS NOTES
SUBJECTIVE:   Kostas Parisi is a 58 y.o. male presents today for CPX.  Review of systems reveals no complaints of chest pain, shortness of breath, orthopnea or PND.  GI and  review of systems is unremarkable.  Patient does report allergies with drainage and congestion.  Otherwise his only other issue is that he has some right shoulder pain.  This started after he fell hiking in November 2023.  He states that occasionally he will feel some stiffness in the shoulder since the fall but then after he \"moves around\" it will loosen up.  No loss of upper extremity  strength.    HPI  See above    Past Medical History, Past Surgical History, Family history, Social History, and Medications were all reviewed with the patient today and updated as necessary.       Current Outpatient Medications   Medication Sig Dispense Refill    levocetirizine (XYZAL) 5 MG tablet Take 1 tablet by mouth nightly      fluticasone (FLONASE) 50 MCG/ACT nasal spray 1 spray by Nasal route daily      diclofenac (VOLTAREN) 75 MG EC tablet 1 tablet twice a day 20 tablet 0    aspirin 81 MG EC tablet Take 1 tablet by mouth in the morning and at bedtime Preventative 42 tablet 0     No current facility-administered medications for this visit.     No Known Allergies  Patient Active Problem List   Diagnosis    Tendonitis, Achilles, right     Past Medical History:   Diagnosis Date    COVID-19 06/2022    patient reported- \"runny nose\"    GERD (gastroesophageal reflux disease)     Kidney stones      Past Surgical History:   Procedure Laterality Date    ACHILLES TENDON SURGERY Right 9/29/2022    right achilles secondary reconstruction/repair/Haglunds excision/possible flexor hallucis longus transfer performed by Marcin Chin III, MD at [facility]    CHOLECYSTECTOMY      COLONOSCOPY      ORTHOPEDIC SURGERY Right     knee surgery     Family History   Problem Relation Age of Onset    Parkinson's Disease Mother      Social History     Tobacco Use

## 2024-11-05 ENCOUNTER — TELEPHONE (OUTPATIENT)
Dept: ORTHOPEDIC SURGERY | Age: 59
End: 2024-11-05

## 2024-11-05 NOTE — TELEPHONE ENCOUNTER
He would like to speak to you regarding his tendinitis in his foot since surgery. I have transferred him to appointments so that he can be seen.

## 2024-11-13 ENCOUNTER — TELEPHONE (OUTPATIENT)
Dept: ORTHOPEDIC SURGERY | Age: 59
End: 2024-11-13

## 2024-11-13 ENCOUNTER — OFFICE VISIT (OUTPATIENT)
Dept: ORTHOPEDIC SURGERY | Age: 59
End: 2024-11-13
Payer: COMMERCIAL

## 2024-11-13 DIAGNOSIS — M76.62 ACHILLES TENDINITIS, LEFT LEG: ICD-10-CM

## 2024-11-13 DIAGNOSIS — M79.672 LEFT FOOT PAIN: Primary | ICD-10-CM

## 2024-11-13 PROCEDURE — 99214 OFFICE O/P EST MOD 30 MIN: CPT | Performed by: ORTHOPAEDIC SURGERY

## 2024-11-13 RX ORDER — METHYLPREDNISOLONE 4 MG/1
TABLET ORAL
Qty: 1 KIT | Refills: 1 | Status: SHIPPED | OUTPATIENT
Start: 2024-11-13 | End: 2024-11-19

## 2024-11-13 RX ORDER — MELOXICAM 15 MG/1
15 TABLET ORAL DAILY
Qty: 30 TABLET | Refills: 0 | Status: SHIPPED | OUTPATIENT
Start: 2024-11-13

## 2024-11-13 NOTE — TELEPHONE ENCOUNTER
He saw EMILIE this morning. He was going to call in a steroid and NSAID for him. The pharmacy only got the steroid. Can you let him know if he is still calling in the other and if he is to start them both at the same time.

## 2024-11-13 NOTE — PROGRESS NOTES
Therapy, Bracing: Placed in: night splint, and Prescription Drug Management  Studies ordered: NO XR needed @ Next Visit    Weight-bearing status: WBAT        Return to work/work restrictions: none  Medrol Dose pack (6 day oral taper):  I discussed medrol being a steroid and how it can elevate blood sugars.  I recommended to monitor sugars closely and to beware of symptoms such as lethargy, excessive thirst, polyuria, and GI distress.  We also discussed the dose pack taper format and how long term steroid use can alter adrenal function.  I also discussed steroids effect on depression and mood.  How in some people it can exacerbate underlying depression while in others create a more manic response.     He will try an off-the-shelf night splint and home exercise program.  He will return in 6 weeks.  Hopefully this will get better and he will need an Achilles tendon reconstruction on the left like he had on the right.

## (undated) DEVICE — SPLINT THMB W4XL30IN FBRGLS PD PRECUT LTWT DURABLE FAST SET

## (undated) DEVICE — RASP SURG L W0.27XL0.55IN TEAR CRSS CUT MIC RECIP SAW

## (undated) DEVICE — KIT INSTR W/ 2.4MM GUIDEPIN SUT PASS WIRE NO2 FIBERWIRE

## (undated) DEVICE — GLOVE SURG SZ 65 L12IN FNGR THK79MIL GRN LTX FREE

## (undated) DEVICE — GLOVE SURG SZ 8 L12IN FNGR THK79MIL GRN LTX FREE

## (undated) DEVICE — DRILL 4.5MM FOR 5.5MM ANCHOR SL-PLUS

## (undated) DEVICE — FOOT DR TOLLISON & WOMACK: Brand: MEDLINE INDUSTRIES, INC.

## (undated) DEVICE — GOWN,SIRUS,NONRNF,SETINSLV,XL,20/CS: Brand: MEDLINE

## (undated) DEVICE — GLOVE SURG SZ 65 CRM LTX FREE POLYISOPRENE POLYMER BEAD ANTI

## (undated) DEVICE — SUTURE VCRL SZ 2-0 L27IN ABSRB UD L26MM CT-2 1/2 CIR J269H